# Patient Record
Sex: MALE | Race: WHITE | NOT HISPANIC OR LATINO | URBAN - METROPOLITAN AREA
[De-identification: names, ages, dates, MRNs, and addresses within clinical notes are randomized per-mention and may not be internally consistent; named-entity substitution may affect disease eponyms.]

---

## 2017-05-18 ENCOUNTER — APPOINTMENT (OUTPATIENT)
Dept: URBAN - METROPOLITAN AREA CLINIC 200 | Age: 47
Setting detail: DERMATOLOGY
End: 2017-05-31

## 2017-05-18 DIAGNOSIS — L57.8 OTHER SKIN CHANGES DUE TO CHRONIC EXPOSURE TO NONIONIZING RADIATION: ICD-10-CM

## 2017-05-18 DIAGNOSIS — L70.0 ACNE VULGARIS: ICD-10-CM

## 2017-05-18 PROBLEM — D48.5 NEOPLASM OF UNCERTAIN BEHAVIOR OF SKIN: Status: ACTIVE | Noted: 2017-05-18

## 2017-05-18 PROCEDURE — OTHER COUNSELING: OTHER

## 2017-05-18 PROCEDURE — 99213 OFFICE O/P EST LOW 20 MIN: CPT

## 2017-05-18 PROCEDURE — OTHER PRESCRIPTION: OTHER

## 2017-05-18 RX ORDER — DOXYCYCLINE HYCLATE 20 MG/1
TABLET, FILM COATED ORAL
Qty: 60 | Refills: 4 | Status: ERX | COMMUNITY
Start: 2017-05-18

## 2017-05-18 RX ORDER — DAPSONE 75 MG/G
GEL TOPICAL
Qty: 1 | Refills: 3 | Status: ERX | COMMUNITY
Start: 2017-05-18

## 2017-05-18 ASSESSMENT — LOCATION ZONE DERM
LOCATION ZONE: FACE
LOCATION ZONE: TRUNK

## 2017-05-18 ASSESSMENT — LOCATION DETAILED DESCRIPTION DERM
LOCATION DETAILED: RIGHT INFERIOR CENTRAL MALAR CHEEK
LOCATION DETAILED: RIGHT SUPERIOR UPPER BACK
LOCATION DETAILED: LEFT CENTRAL MALAR CHEEK

## 2017-05-18 ASSESSMENT — LOCATION SIMPLE DESCRIPTION DERM
LOCATION SIMPLE: RIGHT CHEEK
LOCATION SIMPLE: LEFT CHEEK
LOCATION SIMPLE: RIGHT UPPER BACK

## 2018-07-28 ENCOUNTER — INPATIENT (INPATIENT)
Facility: HOSPITAL | Age: 48
LOS: 5 days | Discharge: HOME | End: 2018-08-03
Attending: INTERNAL MEDICINE | Admitting: INTERNAL MEDICINE
Payer: COMMERCIAL

## 2018-07-28 VITALS
HEART RATE: 130 BPM | RESPIRATION RATE: 20 BRPM | OXYGEN SATURATION: 95 % | SYSTOLIC BLOOD PRESSURE: 136 MMHG | TEMPERATURE: 104 F | DIASTOLIC BLOOD PRESSURE: 94 MMHG | WEIGHT: 173.06 LBS

## 2018-07-28 DIAGNOSIS — L03.221 CELLULITIS OF NECK: ICD-10-CM

## 2018-07-28 DIAGNOSIS — A41.9 SEPSIS, UNSPECIFIED ORGANISM: ICD-10-CM

## 2018-07-28 DIAGNOSIS — F41.9 ANXIETY DISORDER, UNSPECIFIED: ICD-10-CM

## 2018-07-28 DIAGNOSIS — K58.9 IRRITABLE BOWEL SYNDROME WITHOUT DIARRHEA: ICD-10-CM

## 2018-07-28 DIAGNOSIS — Z98.890 OTHER SPECIFIED POSTPROCEDURAL STATES: Chronic | ICD-10-CM

## 2018-07-28 LAB
ALBUMIN SERPL ELPH-MCNC: 3.8 G/DL — SIGNIFICANT CHANGE UP (ref 3.3–5)
ALP SERPL-CCNC: 112 U/L — SIGNIFICANT CHANGE UP (ref 40–120)
ALT FLD-CCNC: 28 U/L — SIGNIFICANT CHANGE UP (ref 12–78)
ANION GAP SERPL CALC-SCNC: 8 MMOL/L — SIGNIFICANT CHANGE UP (ref 5–17)
APPEARANCE UR: CLEAR — SIGNIFICANT CHANGE UP
APTT BLD: 30.8 SEC — SIGNIFICANT CHANGE UP (ref 27.5–37.4)
AST SERPL-CCNC: 33 U/L — SIGNIFICANT CHANGE UP (ref 15–37)
BACTERIA # UR AUTO: ABNORMAL
BASOPHILS # BLD AUTO: 0.02 K/UL — SIGNIFICANT CHANGE UP (ref 0–0.2)
BASOPHILS NFR BLD AUTO: 0.4 % — SIGNIFICANT CHANGE UP (ref 0–2)
BILIRUB SERPL-MCNC: 1 MG/DL — SIGNIFICANT CHANGE UP (ref 0.2–1.2)
BILIRUB UR-MCNC: NEGATIVE — SIGNIFICANT CHANGE UP
BLD GP AB SCN SERPL QL: SIGNIFICANT CHANGE UP
BUN SERPL-MCNC: 15 MG/DL — SIGNIFICANT CHANGE UP (ref 7–23)
CALCIUM SERPL-MCNC: 8.6 MG/DL — SIGNIFICANT CHANGE UP (ref 8.5–10.1)
CHLORIDE SERPL-SCNC: 104 MMOL/L — SIGNIFICANT CHANGE UP (ref 96–108)
CO2 SERPL-SCNC: 28 MMOL/L — SIGNIFICANT CHANGE UP (ref 22–31)
COLOR SPEC: YELLOW — SIGNIFICANT CHANGE UP
CREAT SERPL-MCNC: 1.15 MG/DL — SIGNIFICANT CHANGE UP (ref 0.5–1.3)
CRP SERPL-MCNC: 5.53 MG/DL — HIGH (ref 0–0.4)
DIFF PNL FLD: ABNORMAL
EOSINOPHIL # BLD AUTO: 0.01 K/UL — SIGNIFICANT CHANGE UP (ref 0–0.5)
EOSINOPHIL NFR BLD AUTO: 0.2 % — SIGNIFICANT CHANGE UP (ref 0–6)
EPI CELLS # UR: SIGNIFICANT CHANGE UP
ERYTHROCYTE [SEDIMENTATION RATE] IN BLOOD: 10 MM/HR — SIGNIFICANT CHANGE UP (ref 0–15)
ERYTHROCYTE [SEDIMENTATION RATE] IN BLOOD: 7 MM/HR — SIGNIFICANT CHANGE UP (ref 0–15)
GLUCOSE SERPL-MCNC: 137 MG/DL — HIGH (ref 70–99)
GLUCOSE UR QL: NEGATIVE MG/DL — SIGNIFICANT CHANGE UP
HCT VFR BLD CALC: 43.6 % — SIGNIFICANT CHANGE UP (ref 39–50)
HGB BLD-MCNC: 15.8 G/DL — SIGNIFICANT CHANGE UP (ref 13–17)
IMM GRANULOCYTES NFR BLD AUTO: 0.2 % — SIGNIFICANT CHANGE UP (ref 0–1.5)
INR BLD: 1.09 RATIO — SIGNIFICANT CHANGE UP (ref 0.88–1.16)
KETONES UR-MCNC: ABNORMAL
LACTATE SERPL-SCNC: 0.6 MMOL/L — LOW (ref 0.7–2)
LACTATE SERPL-SCNC: 1.1 MMOL/L — SIGNIFICANT CHANGE UP (ref 0.7–2)
LEUKOCYTE ESTERASE UR-ACNC: NEGATIVE — SIGNIFICANT CHANGE UP
LYMPHOCYTES # BLD AUTO: 0.65 K/UL — LOW (ref 1–3.3)
LYMPHOCYTES # BLD AUTO: 12.3 % — LOW (ref 13–44)
MAGNESIUM SERPL-MCNC: 1.9 MG/DL — SIGNIFICANT CHANGE UP (ref 1.6–2.6)
MCHC RBC-ENTMCNC: 31.3 PG — SIGNIFICANT CHANGE UP (ref 27–34)
MCHC RBC-ENTMCNC: 36.2 GM/DL — HIGH (ref 32–36)
MCV RBC AUTO: 86.3 FL — SIGNIFICANT CHANGE UP (ref 80–100)
MONOCYTES # BLD AUTO: 0.58 K/UL — SIGNIFICANT CHANGE UP (ref 0–0.9)
MONOCYTES NFR BLD AUTO: 11 % — SIGNIFICANT CHANGE UP (ref 2–14)
NEUTROPHILS # BLD AUTO: 4.01 K/UL — SIGNIFICANT CHANGE UP (ref 1.8–7.4)
NEUTROPHILS NFR BLD AUTO: 75.9 % — SIGNIFICANT CHANGE UP (ref 43–77)
NITRITE UR-MCNC: NEGATIVE — SIGNIFICANT CHANGE UP
NRBC # BLD: 0 /100 WBCS — SIGNIFICANT CHANGE UP (ref 0–0)
PH UR: 5 — SIGNIFICANT CHANGE UP (ref 5–8)
PLATELET # BLD AUTO: 184 K/UL — SIGNIFICANT CHANGE UP (ref 150–400)
POTASSIUM SERPL-MCNC: 3.7 MMOL/L — SIGNIFICANT CHANGE UP (ref 3.5–5.3)
POTASSIUM SERPL-SCNC: 3.7 MMOL/L — SIGNIFICANT CHANGE UP (ref 3.5–5.3)
PROT SERPL-MCNC: 7.2 GM/DL — SIGNIFICANT CHANGE UP (ref 6–8.3)
PROT UR-MCNC: 15 MG/DL
PROTHROM AB SERPL-ACNC: 11.9 SEC — SIGNIFICANT CHANGE UP (ref 9.8–12.7)
RBC # BLD: 5.05 M/UL — SIGNIFICANT CHANGE UP (ref 4.2–5.8)
RBC # FLD: 11.8 % — SIGNIFICANT CHANGE UP (ref 10.3–14.5)
RBC CASTS # UR COMP ASSIST: SIGNIFICANT CHANGE UP /HPF (ref 0–4)
SODIUM SERPL-SCNC: 140 MMOL/L — SIGNIFICANT CHANGE UP (ref 135–145)
SP GR SPEC: 1.02 — SIGNIFICANT CHANGE UP (ref 1.01–1.02)
UROBILINOGEN FLD QL: NEGATIVE MG/DL — SIGNIFICANT CHANGE UP
WBC # BLD: 5.28 K/UL — SIGNIFICANT CHANGE UP (ref 3.8–10.5)
WBC # FLD AUTO: 5.28 K/UL — SIGNIFICANT CHANGE UP (ref 3.8–10.5)
WBC UR QL: SIGNIFICANT CHANGE UP

## 2018-07-28 PROCEDURE — 71045 X-RAY EXAM CHEST 1 VIEW: CPT | Mod: 26

## 2018-07-28 PROCEDURE — 99291 CRITICAL CARE FIRST HOUR: CPT

## 2018-07-28 PROCEDURE — 72141 MRI NECK SPINE W/O DYE: CPT | Mod: 26

## 2018-07-28 RX ORDER — HYDROMORPHONE HYDROCHLORIDE 2 MG/ML
1 INJECTION INTRAMUSCULAR; INTRAVENOUS; SUBCUTANEOUS ONCE
Qty: 0 | Refills: 0 | Status: DISCONTINUED | OUTPATIENT
Start: 2018-07-28 | End: 2018-07-28

## 2018-07-28 RX ORDER — DIAZEPAM 5 MG
5 TABLET ORAL
Qty: 0 | Refills: 0 | Status: DISCONTINUED | OUTPATIENT
Start: 2018-07-28 | End: 2018-08-03

## 2018-07-28 RX ORDER — SODIUM CHLORIDE 9 MG/ML
1000 INJECTION INTRAMUSCULAR; INTRAVENOUS; SUBCUTANEOUS
Qty: 0 | Refills: 0 | Status: DISCONTINUED | OUTPATIENT
Start: 2018-07-28 | End: 2018-08-03

## 2018-07-28 RX ORDER — PIPERACILLIN AND TAZOBACTAM 4; .5 G/20ML; G/20ML
3.38 INJECTION, POWDER, LYOPHILIZED, FOR SOLUTION INTRAVENOUS ONCE
Qty: 0 | Refills: 0 | Status: COMPLETED | OUTPATIENT
Start: 2018-07-28 | End: 2018-07-28

## 2018-07-28 RX ORDER — ENOXAPARIN SODIUM 100 MG/ML
40 INJECTION SUBCUTANEOUS DAILY
Qty: 0 | Refills: 0 | Status: DISCONTINUED | OUTPATIENT
Start: 2018-07-28 | End: 2018-08-03

## 2018-07-28 RX ORDER — SODIUM CHLORIDE 9 MG/ML
3000 INJECTION INTRAMUSCULAR; INTRAVENOUS; SUBCUTANEOUS ONCE
Qty: 0 | Refills: 0 | Status: COMPLETED | OUTPATIENT
Start: 2018-07-28 | End: 2018-07-28

## 2018-07-28 RX ORDER — ACETAMINOPHEN 500 MG
650 TABLET ORAL EVERY 6 HOURS
Qty: 0 | Refills: 0 | Status: DISCONTINUED | OUTPATIENT
Start: 2018-07-28 | End: 2018-08-02

## 2018-07-28 RX ORDER — DIAZEPAM 5 MG
0 TABLET ORAL
Qty: 0 | Refills: 0 | COMMUNITY

## 2018-07-28 RX ORDER — ACETAMINOPHEN 500 MG
975 TABLET ORAL ONCE
Qty: 0 | Refills: 0 | Status: COMPLETED | OUTPATIENT
Start: 2018-07-28 | End: 2018-07-28

## 2018-07-28 RX ORDER — VANCOMYCIN HCL 1 G
1000 VIAL (EA) INTRAVENOUS ONCE
Qty: 0 | Refills: 0 | Status: COMPLETED | OUTPATIENT
Start: 2018-07-28 | End: 2018-07-28

## 2018-07-28 RX ORDER — ACETAMINOPHEN 500 MG
650 TABLET ORAL EVERY 6 HOURS
Qty: 0 | Refills: 0 | Status: DISCONTINUED | OUTPATIENT
Start: 2018-07-28 | End: 2018-07-30

## 2018-07-28 RX ORDER — DIPHENOXYLATE HCL/ATROPINE 2.5-.025MG
1 TABLET ORAL
Qty: 0 | Refills: 0 | Status: DISCONTINUED | OUTPATIENT
Start: 2018-07-28 | End: 2018-07-30

## 2018-07-28 RX ORDER — IBUPROFEN 200 MG
400 TABLET ORAL ONCE
Qty: 0 | Refills: 0 | Status: COMPLETED | OUTPATIENT
Start: 2018-07-28 | End: 2018-07-28

## 2018-07-28 RX ORDER — HYDROMORPHONE HYDROCHLORIDE 2 MG/ML
2 INJECTION INTRAMUSCULAR; INTRAVENOUS; SUBCUTANEOUS EVERY 6 HOURS
Qty: 0 | Refills: 0 | Status: DISCONTINUED | OUTPATIENT
Start: 2018-07-28 | End: 2018-07-31

## 2018-07-28 RX ORDER — DIPHENHYDRAMINE HCL 50 MG
25 CAPSULE ORAL ONCE
Qty: 0 | Refills: 0 | Status: COMPLETED | OUTPATIENT
Start: 2018-07-28 | End: 2018-07-28

## 2018-07-28 RX ORDER — OXYCODONE AND ACETAMINOPHEN 5; 325 MG/1; MG/1
1 TABLET ORAL EVERY 6 HOURS
Qty: 0 | Refills: 0 | Status: DISCONTINUED | OUTPATIENT
Start: 2018-07-28 | End: 2018-07-30

## 2018-07-28 RX ORDER — DIPHENOXYLATE HCL/ATROPINE 2.5-.025MG
0 TABLET ORAL
Qty: 0 | Refills: 0 | COMMUNITY

## 2018-07-28 RX ORDER — PIPERACILLIN AND TAZOBACTAM 4; .5 G/20ML; G/20ML
3.38 INJECTION, POWDER, LYOPHILIZED, FOR SOLUTION INTRAVENOUS EVERY 8 HOURS
Qty: 0 | Refills: 0 | Status: DISCONTINUED | OUTPATIENT
Start: 2018-07-28 | End: 2018-07-29

## 2018-07-28 RX ORDER — VANCOMYCIN HCL 1 G
1000 VIAL (EA) INTRAVENOUS EVERY 12 HOURS
Qty: 0 | Refills: 0 | Status: DISCONTINUED | OUTPATIENT
Start: 2018-07-28 | End: 2018-08-01

## 2018-07-28 RX ADMIN — SODIUM CHLORIDE 3000 MILLILITER(S): 9 INJECTION INTRAMUSCULAR; INTRAVENOUS; SUBCUTANEOUS at 15:46

## 2018-07-28 RX ADMIN — HYDROMORPHONE HYDROCHLORIDE 2 MILLIGRAM(S): 2 INJECTION INTRAMUSCULAR; INTRAVENOUS; SUBCUTANEOUS at 23:37

## 2018-07-28 RX ADMIN — Medication 400 MILLIGRAM(S): at 19:16

## 2018-07-28 RX ADMIN — PIPERACILLIN AND TAZOBACTAM 200 GRAM(S): 4; .5 INJECTION, POWDER, LYOPHILIZED, FOR SOLUTION INTRAVENOUS at 15:46

## 2018-07-28 RX ADMIN — Medication 1000 MILLIGRAM(S): at 17:41

## 2018-07-28 RX ADMIN — PIPERACILLIN AND TAZOBACTAM 3.38 GRAM(S): 4; .5 INJECTION, POWDER, LYOPHILIZED, FOR SOLUTION INTRAVENOUS at 16:42

## 2018-07-28 RX ADMIN — Medication 250 MILLIGRAM(S): at 16:42

## 2018-07-28 RX ADMIN — SODIUM CHLORIDE 3000 MILLILITER(S): 9 INJECTION INTRAMUSCULAR; INTRAVENOUS; SUBCUTANEOUS at 17:15

## 2018-07-28 RX ADMIN — HYDROMORPHONE HYDROCHLORIDE 1 MILLIGRAM(S): 2 INJECTION INTRAMUSCULAR; INTRAVENOUS; SUBCUTANEOUS at 18:42

## 2018-07-28 RX ADMIN — HYDROMORPHONE HYDROCHLORIDE 1 MILLIGRAM(S): 2 INJECTION INTRAMUSCULAR; INTRAVENOUS; SUBCUTANEOUS at 15:46

## 2018-07-28 RX ADMIN — HYDROMORPHONE HYDROCHLORIDE 1 MILLIGRAM(S): 2 INJECTION INTRAMUSCULAR; INTRAVENOUS; SUBCUTANEOUS at 17:53

## 2018-07-28 RX ADMIN — HYDROMORPHONE HYDROCHLORIDE 2 MILLIGRAM(S): 2 INJECTION INTRAMUSCULAR; INTRAVENOUS; SUBCUTANEOUS at 23:21

## 2018-07-28 RX ADMIN — Medication 25 MILLIGRAM(S): at 17:53

## 2018-07-28 RX ADMIN — SODIUM CHLORIDE 75 MILLILITER(S): 9 INJECTION INTRAMUSCULAR; INTRAVENOUS; SUBCUTANEOUS at 20:33

## 2018-07-28 RX ADMIN — PIPERACILLIN AND TAZOBACTAM 25 GRAM(S): 4; .5 INJECTION, POWDER, LYOPHILIZED, FOR SOLUTION INTRAVENOUS at 22:32

## 2018-07-28 RX ADMIN — Medication 975 MILLIGRAM(S): at 16:01

## 2018-07-28 RX ADMIN — Medication 400 MILLIGRAM(S): at 18:40

## 2018-07-28 RX ADMIN — HYDROMORPHONE HYDROCHLORIDE 1 MILLIGRAM(S): 2 INJECTION INTRAMUSCULAR; INTRAVENOUS; SUBCUTANEOUS at 16:05

## 2018-07-28 NOTE — ED ADULT NURSE NOTE - CHIEF COMPLAINT QUOTE
pt sent in from [facility] s/p cercival spine surgery july 2nd 2018, now febrile and tachycardic told to report to the ED code sepsis called

## 2018-07-28 NOTE — ED ADULT NURSE NOTE - OBJECTIVE STATEMENT
49 yo male c/o fever and pain x 2-3 days, first low grade now higher, went to urgent care today, told to go to ER right away, pt ambulated into fast track c a steady gait and no assistance needed, s/p cervical spine surgery 07/02/18 [place]. 47 yo male c/o fever and pain x 2-3 days, first low grade now higher, went to urgent care today, told to go to ER right away to r/o infection. , pt ambulated into fast track c a steady gait and no assistance needed, s/p cervical spine surgery 07/02/18 in [place]. pt seen at [facility] urgent care for fever post-surgical c concern for spinal abscess at surgical site. neuro Surgeon Cornell Yan.

## 2018-07-28 NOTE — ED ADULT NURSE NOTE - CHPI ED SYMPTOMS NEG
no cough/no shortness of breath/no vomiting/no chills/no diarrhea/no decreased eating/drinking/no rash/no headache/no abdominal pain

## 2018-07-28 NOTE — ED ADULT NURSE NOTE - CAS EDN DISCHARGE ASSESSMENT
Alert and oriented to person, place and time Alert and oriented to person, place and time/Patient in no distress.

## 2018-07-28 NOTE — H&P ADULT - HISTORY OF PRESENT ILLNESS
presents  with  fever  for  3 days  had  cervical  spine laminectomy  3  weeks  ago evaluated  in  urgent care center  advised to  go  to  Er  evaluated  in  Er  found  to  have  sepsis  admitted  for  further  w/u  and  treatment

## 2018-07-28 NOTE — ED PROVIDER NOTE - PHYSICAL EXAMINATION
Gen: Alert, NAD  Head: NC, AT   Eyes: PERRL, EOMI, normal lids/conjunctiva  ENT: normal hearing, patent oropharynx without erythema/exudate, uvula midline  Neck: supple, no tenderness, Trachea midline  Pulm: Bilateral BS, normal resp effort, no wheeze/stridor/retractions  CV: tachycardia, no M/R/G, 2+ radial and dp pulses bl, no edema  Abd: soft, NT/ND, +BS, no hepatosplenomegaly  Mskel: extremities x4 with normal ROM and no joint effusions. warmth and soft tissue swelling along the cervical spine.   Skin: no rash, no bruising   Neuro: AAOx3, no sensory/motor deficits, CN 2-12 intact

## 2018-07-28 NOTE — ED PROVIDER NOTE - OBJECTIVE STATEMENT
Pertinent PMH/PSH/FHx/SHx and Review of Systems contained within:  48M hx of cervical spine surgery jul 2 in [place] by dr aviles pw 2-3 days of fever and increasing pain in the cervical spine region where the surgery took place. patient has no nausea, vomiting, cough, abd pain, dysuria, rhinorrhea, rash or bleeding. Patient denies numbness, tingling or weakness of the upper or lower extremity and denies retention or incontinence of the bowel or bladder. patient has been taking tylenol for the fever intermittently and hydromorphone for pain.   Fh and Sh not otherwise contributory  ROS otherwise negative

## 2018-07-28 NOTE — H&P ADULT - NSHPPHYSICALEXAM_GEN_ALL_CORE
PHYSICAL EXAM:    GENERAL: NAD, well-groomed, well-developed  HEAD:  Atraumatic, Normocephalic  EYES: EOMI, PERRLA, conjunctiva and sclera clear  ENMT: No tonsillar erythema, exudates, or enlargement; Moist mucous membranes, , No lesions  NECK: Supple, No JVD, Normal thyroid  NERVOUS SYSTEM:  Alert & Oriented X4, ; Motor Strength 5/5 B/L upper and lower extremities; DTRs 2+ intact and symmetric  CHEST/LUNG: Clear  bilaterally; No rales, rhonchi, wheezing, or rubs  HEART: Regular rate and rhythm; No murmurs, rubs, or gallops  ABDOMEN: Soft, Nontender, Nondistended; no  masses Bowel sounds present  EXTREMITIES:  + Peripheral Pulses, No clubbing, cyanosis, or edema  LYMPH: No lymphadenopathy noted   RECTAL: deferred  SKIN: No rashes or lesions  NECK  surgical  wound  clean  no bleeding  no  discharge  mild  surrounding  erythema no t enderness

## 2018-07-28 NOTE — ED PROVIDER NOTE - MEDICAL DECISION MAKING DETAILS
patient pw sepsis in the context of recent cervical spine operation. will MRI stat to rule out cervical spine infection. will xray to rule out pna and get urine to rule out uti patient pw sepsis in the context of recent cervical spine operation. will MRI stat to rule out cervical spine infection. mri reassuring. will xray to rule out pna and get urine to rule out uti. suspect the etiology of the fever is cellulitis. will admit for cultures to result. I read ekg as sinus tach rate 114 with low voltages, no st elevation or depression, no qtc or pr prolongation.

## 2018-07-28 NOTE — H&P ADULT - NSHPLABSRESULTS_GEN_ALL_CORE
LABS:                        15.8   .  )-----------( 184      ( 2018 15:53 )             43.6         140  |  104  |  15  ----------------------------<  137<H>  3.7   |  28  |  1.15    Ca    8.6      2018 15:53  Mg     1.9         TPro  7.2  /  Alb  3.8  /  TBili  1.0  /  DBili  x   /  AST  33  /  ALT  28  /  AlkPhos  112      PT/INR - ( 2018 15:53 )   PT: 11.9 sec;   INR: 1.09 ratio         PTT - ( 2018 15:53 )  PTT:30.8 sec  Urinalysis Basic - ( 2018 17:18 )    Color: Yellow / Appearance: Clear / S.020 / pH: x  Gluc: x / Ketone: Trace  / Bili: Negative / Urobili: Negative mg/dL   Blood: x / Protein: 15 mg/dL / Nitrite: Negative   Leuk Esterase: Negative / RBC: 0-2 /HPF / WBC 0-2   Sq Epi: x / Non Sq Epi: Occasional / Bacteria: Moderate

## 2018-07-28 NOTE — ED ADULT NURSE NOTE - LINE DESCRIPTION (INCLUDE FLUIDS IF APPLICABLE)
BUE #18 g, flush without difficulty, no s/s of infiltraiton BUE #18 g, flush without difficulty, no s/s of infiltration, new bag of zosyn infusing.

## 2018-07-28 NOTE — ED ADULT NURSE REASSESSMENT NOTE - NS ED NURSE REASSESS COMMENT FT1
pt reports feeling better, itching resolved, pain decreased, pt comfortable. pt aware he is admitted. VS STABLE AFEBRILE CALL UMANZOR IN REACH, SAFETY MAINTAINED PT PLACED ON TELE will continue to monitor.

## 2018-07-28 NOTE — H&P ADULT - NSHPREVIEWOFSYSTEMS_GEN_ALL_CORE
+ body  aches  fever  mild  neck  pain .    no  cough  no  sore  throat  no  dysuria  no  abd  pain  no  ha

## 2018-07-28 NOTE — ED ADULT TRIAGE NOTE - CHIEF COMPLAINT QUOTE
pt sent in from [facility] s/p cercival spine surgery july 2nd 2018, now febrile and tachycardic told to report to the ED code sepsis called

## 2018-07-28 NOTE — ED PROVIDER NOTE - CARE PLAN
Principal Discharge DX:	Sepsis, due to unspecified organism Principal Discharge DX:	Sepsis, due to unspecified organism  Secondary Diagnosis:	Cellulitis of neck

## 2018-07-29 LAB
ALBUMIN SERPL ELPH-MCNC: 2.8 G/DL — LOW (ref 3.3–5)
ALP SERPL-CCNC: 86 U/L — SIGNIFICANT CHANGE UP (ref 40–120)
ALT FLD-CCNC: 31 U/L — SIGNIFICANT CHANGE UP (ref 12–78)
ANION GAP SERPL CALC-SCNC: 9 MMOL/L — SIGNIFICANT CHANGE UP (ref 5–17)
AST SERPL-CCNC: 41 U/L — HIGH (ref 15–37)
BILIRUB SERPL-MCNC: 0.7 MG/DL — SIGNIFICANT CHANGE UP (ref 0.2–1.2)
BUN SERPL-MCNC: 10 MG/DL — SIGNIFICANT CHANGE UP (ref 7–23)
CALCIUM SERPL-MCNC: 7.6 MG/DL — LOW (ref 8.5–10.1)
CHLORIDE SERPL-SCNC: 108 MMOL/L — SIGNIFICANT CHANGE UP (ref 96–108)
CO2 SERPL-SCNC: 24 MMOL/L — SIGNIFICANT CHANGE UP (ref 22–31)
CREAT SERPL-MCNC: 0.94 MG/DL — SIGNIFICANT CHANGE UP (ref 0.5–1.3)
CRP SERPL-MCNC: 4.53 MG/DL — HIGH (ref 0–0.4)
CULTURE RESULTS: NO GROWTH — SIGNIFICANT CHANGE UP
GLUCOSE SERPL-MCNC: 137 MG/DL — HIGH (ref 70–99)
HCT VFR BLD CALC: 35.4 % — LOW (ref 39–50)
HGB BLD-MCNC: 13 G/DL — SIGNIFICANT CHANGE UP (ref 13–17)
HIV 1+2 AB+HIV1 P24 AG SERPL QL IA: SIGNIFICANT CHANGE UP
LACTATE SERPL-SCNC: 0.6 MMOL/L — LOW (ref 0.7–2)
MCHC RBC-ENTMCNC: 32 PG — SIGNIFICANT CHANGE UP (ref 27–34)
MCHC RBC-ENTMCNC: 36.7 GM/DL — HIGH (ref 32–36)
MCV RBC AUTO: 87.2 FL — SIGNIFICANT CHANGE UP (ref 80–100)
NRBC # BLD: 0 /100 WBCS — SIGNIFICANT CHANGE UP (ref 0–0)
PLATELET # BLD AUTO: 156 K/UL — SIGNIFICANT CHANGE UP (ref 150–400)
POTASSIUM SERPL-MCNC: 3.5 MMOL/L — SIGNIFICANT CHANGE UP (ref 3.5–5.3)
POTASSIUM SERPL-SCNC: 3.5 MMOL/L — SIGNIFICANT CHANGE UP (ref 3.5–5.3)
PROT SERPL-MCNC: 5.4 GM/DL — LOW (ref 6–8.3)
RBC # BLD: 4.06 M/UL — LOW (ref 4.2–5.8)
RBC # FLD: 11.7 % — SIGNIFICANT CHANGE UP (ref 10.3–14.5)
SODIUM SERPL-SCNC: 141 MMOL/L — SIGNIFICANT CHANGE UP (ref 135–145)
SPECIMEN SOURCE: SIGNIFICANT CHANGE UP
WBC # BLD: 4.06 K/UL — SIGNIFICANT CHANGE UP (ref 3.8–10.5)
WBC # FLD AUTO: 4.06 K/UL — SIGNIFICANT CHANGE UP (ref 3.8–10.5)

## 2018-07-29 PROCEDURE — 93010 ELECTROCARDIOGRAM REPORT: CPT

## 2018-07-29 RX ORDER — CEFEPIME 1 G/1
2000 INJECTION, POWDER, FOR SOLUTION INTRAMUSCULAR; INTRAVENOUS EVERY 8 HOURS
Qty: 0 | Refills: 0 | Status: DISCONTINUED | OUTPATIENT
Start: 2018-07-30 | End: 2018-08-01

## 2018-07-29 RX ORDER — ALPRAZOLAM 0.25 MG
1 TABLET ORAL AT BEDTIME
Qty: 0 | Refills: 0 | Status: DISCONTINUED | OUTPATIENT
Start: 2018-07-29 | End: 2018-07-29

## 2018-07-29 RX ORDER — IBUPROFEN 200 MG
400 TABLET ORAL ONCE
Qty: 0 | Refills: 0 | Status: COMPLETED | OUTPATIENT
Start: 2018-07-29 | End: 2018-07-29

## 2018-07-29 RX ORDER — CEFEPIME 1 G/1
INJECTION, POWDER, FOR SOLUTION INTRAMUSCULAR; INTRAVENOUS
Qty: 0 | Refills: 0 | Status: DISCONTINUED | OUTPATIENT
Start: 2018-07-30 | End: 2018-08-01

## 2018-07-29 RX ORDER — CEFEPIME 1 G/1
2000 INJECTION, POWDER, FOR SOLUTION INTRAMUSCULAR; INTRAVENOUS ONCE
Qty: 0 | Refills: 0 | Status: COMPLETED | OUTPATIENT
Start: 2018-07-29 | End: 2018-07-30

## 2018-07-29 RX ORDER — DIPHENHYDRAMINE HCL 50 MG
25 CAPSULE ORAL ONCE
Qty: 0 | Refills: 0 | Status: COMPLETED | OUTPATIENT
Start: 2018-07-29 | End: 2018-07-29

## 2018-07-29 RX ADMIN — Medication 5 MILLIGRAM(S): at 14:19

## 2018-07-29 RX ADMIN — Medication 25 MILLIGRAM(S): at 19:44

## 2018-07-29 RX ADMIN — Medication 1 MILLIGRAM(S): at 22:10

## 2018-07-29 RX ADMIN — Medication 400 MILLIGRAM(S): at 17:45

## 2018-07-29 RX ADMIN — HYDROMORPHONE HYDROCHLORIDE 2 MILLIGRAM(S): 2 INJECTION INTRAMUSCULAR; INTRAVENOUS; SUBCUTANEOUS at 18:26

## 2018-07-29 RX ADMIN — HYDROMORPHONE HYDROCHLORIDE 2 MILLIGRAM(S): 2 INJECTION INTRAMUSCULAR; INTRAVENOUS; SUBCUTANEOUS at 06:20

## 2018-07-29 RX ADMIN — HYDROMORPHONE HYDROCHLORIDE 2 MILLIGRAM(S): 2 INJECTION INTRAMUSCULAR; INTRAVENOUS; SUBCUTANEOUS at 18:37

## 2018-07-29 RX ADMIN — Medication 400 MILLIGRAM(S): at 18:09

## 2018-07-29 RX ADMIN — OXYCODONE AND ACETAMINOPHEN 1 TABLET(S): 5; 325 TABLET ORAL at 05:20

## 2018-07-29 RX ADMIN — HYDROMORPHONE HYDROCHLORIDE 2 MILLIGRAM(S): 2 INJECTION INTRAMUSCULAR; INTRAVENOUS; SUBCUTANEOUS at 12:35

## 2018-07-29 RX ADMIN — Medication 5 MILLIGRAM(S): at 01:51

## 2018-07-29 RX ADMIN — OXYCODONE AND ACETAMINOPHEN 1 TABLET(S): 5; 325 TABLET ORAL at 09:54

## 2018-07-29 RX ADMIN — OXYCODONE AND ACETAMINOPHEN 1 TABLET(S): 5; 325 TABLET ORAL at 22:09

## 2018-07-29 RX ADMIN — HYDROMORPHONE HYDROCHLORIDE 2 MILLIGRAM(S): 2 INJECTION INTRAMUSCULAR; INTRAVENOUS; SUBCUTANEOUS at 06:35

## 2018-07-29 RX ADMIN — OXYCODONE AND ACETAMINOPHEN 1 TABLET(S): 5; 325 TABLET ORAL at 23:10

## 2018-07-29 RX ADMIN — ENOXAPARIN SODIUM 40 MILLIGRAM(S): 100 INJECTION SUBCUTANEOUS at 12:25

## 2018-07-29 RX ADMIN — Medication 650 MILLIGRAM(S): at 17:28

## 2018-07-29 RX ADMIN — OXYCODONE AND ACETAMINOPHEN 1 TABLET(S): 5; 325 TABLET ORAL at 04:19

## 2018-07-29 RX ADMIN — Medication 650 MILLIGRAM(S): at 09:59

## 2018-07-29 RX ADMIN — Medication 650 MILLIGRAM(S): at 01:59

## 2018-07-29 RX ADMIN — Medication 250 MILLIGRAM(S): at 06:19

## 2018-07-29 RX ADMIN — Medication 250 MILLIGRAM(S): at 17:35

## 2018-07-29 RX ADMIN — OXYCODONE AND ACETAMINOPHEN 1 TABLET(S): 5; 325 TABLET ORAL at 10:50

## 2018-07-29 RX ADMIN — PIPERACILLIN AND TAZOBACTAM 25 GRAM(S): 4; .5 INJECTION, POWDER, LYOPHILIZED, FOR SOLUTION INTRAVENOUS at 14:11

## 2018-07-29 RX ADMIN — PIPERACILLIN AND TAZOBACTAM 25 GRAM(S): 4; .5 INJECTION, POWDER, LYOPHILIZED, FOR SOLUTION INTRAVENOUS at 22:06

## 2018-07-29 RX ADMIN — HYDROMORPHONE HYDROCHLORIDE 2 MILLIGRAM(S): 2 INJECTION INTRAMUSCULAR; INTRAVENOUS; SUBCUTANEOUS at 12:23

## 2018-07-29 RX ADMIN — PIPERACILLIN AND TAZOBACTAM 25 GRAM(S): 4; .5 INJECTION, POWDER, LYOPHILIZED, FOR SOLUTION INTRAVENOUS at 06:19

## 2018-07-29 NOTE — CHART NOTE - NSCHARTNOTEFT_GEN_A_CORE
infectiuos disease prelim   chart reviewed   only pain in cervical area  sp laminectomy   on antibiotics already   discussed with nurse   minimal swelling of neck   minimal erythema   no nausea vomiting diarrhea as per nurse; has  ibs; on lomotil  very low esr crp for infection/osteo   mri NEGATIVE   will check vanco levels   change to fortaz vanco   will follow with you thanks   full consult am

## 2018-07-30 LAB
ANION GAP SERPL CALC-SCNC: 10 MMOL/L — SIGNIFICANT CHANGE UP (ref 5–17)
BUN SERPL-MCNC: 10 MG/DL — SIGNIFICANT CHANGE UP (ref 7–23)
CALCIUM SERPL-MCNC: 7.7 MG/DL — LOW (ref 8.5–10.1)
CHLORIDE SERPL-SCNC: 106 MMOL/L — SIGNIFICANT CHANGE UP (ref 96–108)
CO2 SERPL-SCNC: 24 MMOL/L — SIGNIFICANT CHANGE UP (ref 22–31)
CREAT SERPL-MCNC: 1.18 MG/DL — SIGNIFICANT CHANGE UP (ref 0.5–1.3)
CRP SERPL-MCNC: 8.25 MG/DL — HIGH (ref 0–0.4)
FLUAV SPEC QL CULT: NEGATIVE — SIGNIFICANT CHANGE UP
FLUBV AG SPEC QL IA: NEGATIVE — SIGNIFICANT CHANGE UP
GLUCOSE SERPL-MCNC: 151 MG/DL — HIGH (ref 70–99)
HBA1C BLD-MCNC: 5.1 % — SIGNIFICANT CHANGE UP (ref 4–5.6)
HCT VFR BLD CALC: 33.9 % — LOW (ref 39–50)
HGB BLD-MCNC: 12.3 G/DL — LOW (ref 13–17)
LACTATE SERPL-SCNC: 0.9 MMOL/L — SIGNIFICANT CHANGE UP (ref 0.7–2)
MCHC RBC-ENTMCNC: 31.1 PG — SIGNIFICANT CHANGE UP (ref 27–34)
MCHC RBC-ENTMCNC: 36.3 GM/DL — HIGH (ref 32–36)
MCV RBC AUTO: 85.8 FL — SIGNIFICANT CHANGE UP (ref 80–100)
NRBC # BLD: 0 /100 WBCS — SIGNIFICANT CHANGE UP (ref 0–0)
PLATELET # BLD AUTO: 122 K/UL — LOW (ref 150–400)
POTASSIUM SERPL-MCNC: 3.2 MMOL/L — LOW (ref 3.5–5.3)
POTASSIUM SERPL-SCNC: 3.2 MMOL/L — LOW (ref 3.5–5.3)
RBC # BLD: 3.95 M/UL — LOW (ref 4.2–5.8)
RBC # FLD: 11.6 % — SIGNIFICANT CHANGE UP (ref 10.3–14.5)
SODIUM SERPL-SCNC: 140 MMOL/L — SIGNIFICANT CHANGE UP (ref 135–145)
VANCOMYCIN TROUGH SERPL-MCNC: 6.8 UG/ML — LOW (ref 10–20)
WBC # BLD: 3.56 K/UL — LOW (ref 3.8–10.5)
WBC # FLD AUTO: 3.56 K/UL — LOW (ref 3.8–10.5)

## 2018-07-30 RX ORDER — SODIUM CHLORIDE 9 MG/ML
1000 INJECTION, SOLUTION INTRAVENOUS ONCE
Qty: 0 | Refills: 0 | Status: COMPLETED | OUTPATIENT
Start: 2018-07-30 | End: 2018-07-30

## 2018-07-30 RX ORDER — DOCUSATE SODIUM 100 MG
100 CAPSULE ORAL
Qty: 0 | Refills: 0 | Status: DISCONTINUED | OUTPATIENT
Start: 2018-07-30 | End: 2018-08-02

## 2018-07-30 RX ORDER — SENNA PLUS 8.6 MG/1
2 TABLET ORAL AT BEDTIME
Qty: 0 | Refills: 0 | Status: DISCONTINUED | OUTPATIENT
Start: 2018-07-30 | End: 2018-08-02

## 2018-07-30 RX ORDER — POTASSIUM CHLORIDE 20 MEQ
10 PACKET (EA) ORAL ONCE
Qty: 0 | Refills: 0 | Status: COMPLETED | OUTPATIENT
Start: 2018-07-30 | End: 2018-07-30

## 2018-07-30 RX ORDER — ALPRAZOLAM 0.25 MG
1 TABLET ORAL AT BEDTIME
Qty: 0 | Refills: 0 | Status: DISCONTINUED | OUTPATIENT
Start: 2018-07-30 | End: 2018-07-30

## 2018-07-30 RX ORDER — ACETAMINOPHEN 500 MG
650 TABLET ORAL EVERY 6 HOURS
Qty: 0 | Refills: 0 | Status: DISCONTINUED | OUTPATIENT
Start: 2018-07-30 | End: 2018-07-31

## 2018-07-30 RX ORDER — ONDANSETRON 8 MG/1
4 TABLET, FILM COATED ORAL EVERY 8 HOURS
Qty: 0 | Refills: 0 | Status: DISCONTINUED | OUTPATIENT
Start: 2018-07-30 | End: 2018-08-03

## 2018-07-30 RX ORDER — DIPHENHYDRAMINE HCL 50 MG
25 CAPSULE ORAL EVERY 6 HOURS
Qty: 0 | Refills: 0 | Status: DISCONTINUED | OUTPATIENT
Start: 2018-07-30 | End: 2018-08-03

## 2018-07-30 RX ORDER — POLYETHYLENE GLYCOL 3350 17 G/17G
17 POWDER, FOR SOLUTION ORAL DAILY
Qty: 0 | Refills: 0 | Status: DISCONTINUED | OUTPATIENT
Start: 2018-07-30 | End: 2018-08-03

## 2018-07-30 RX ORDER — POTASSIUM CHLORIDE 20 MEQ
40 PACKET (EA) ORAL ONCE
Qty: 0 | Refills: 0 | Status: COMPLETED | OUTPATIENT
Start: 2018-07-30 | End: 2018-07-30

## 2018-07-30 RX ADMIN — Medication 100 MILLIEQUIVALENT(S): at 11:55

## 2018-07-30 RX ADMIN — SODIUM CHLORIDE 1000 MILLILITER(S): 9 INJECTION, SOLUTION INTRAVENOUS at 09:18

## 2018-07-30 RX ADMIN — HYDROMORPHONE HYDROCHLORIDE 2 MILLIGRAM(S): 2 INJECTION INTRAMUSCULAR; INTRAVENOUS; SUBCUTANEOUS at 15:32

## 2018-07-30 RX ADMIN — HYDROMORPHONE HYDROCHLORIDE 2 MILLIGRAM(S): 2 INJECTION INTRAMUSCULAR; INTRAVENOUS; SUBCUTANEOUS at 16:00

## 2018-07-30 RX ADMIN — CEFEPIME 100 MILLIGRAM(S): 1 INJECTION, POWDER, FOR SOLUTION INTRAMUSCULAR; INTRAVENOUS at 21:32

## 2018-07-30 RX ADMIN — ENOXAPARIN SODIUM 40 MILLIGRAM(S): 100 INJECTION SUBCUTANEOUS at 11:55

## 2018-07-30 RX ADMIN — Medication 25 MILLIGRAM(S): at 23:24

## 2018-07-30 RX ADMIN — HYDROMORPHONE HYDROCHLORIDE 2 MILLIGRAM(S): 2 INJECTION INTRAMUSCULAR; INTRAVENOUS; SUBCUTANEOUS at 01:27

## 2018-07-30 RX ADMIN — SODIUM CHLORIDE 75 MILLILITER(S): 9 INJECTION INTRAMUSCULAR; INTRAVENOUS; SUBCUTANEOUS at 21:31

## 2018-07-30 RX ADMIN — HYDROMORPHONE HYDROCHLORIDE 2 MILLIGRAM(S): 2 INJECTION INTRAMUSCULAR; INTRAVENOUS; SUBCUTANEOUS at 21:49

## 2018-07-30 RX ADMIN — Medication 250 MILLIGRAM(S): at 21:31

## 2018-07-30 RX ADMIN — Medication 650 MILLIGRAM(S): at 11:55

## 2018-07-30 RX ADMIN — Medication 650 MILLIGRAM(S): at 05:07

## 2018-07-30 RX ADMIN — HYDROMORPHONE HYDROCHLORIDE 2 MILLIGRAM(S): 2 INJECTION INTRAMUSCULAR; INTRAVENOUS; SUBCUTANEOUS at 09:09

## 2018-07-30 RX ADMIN — SODIUM CHLORIDE 75 MILLILITER(S): 9 INJECTION INTRAMUSCULAR; INTRAVENOUS; SUBCUTANEOUS at 01:29

## 2018-07-30 RX ADMIN — HYDROMORPHONE HYDROCHLORIDE 2 MILLIGRAM(S): 2 INJECTION INTRAMUSCULAR; INTRAVENOUS; SUBCUTANEOUS at 10:00

## 2018-07-30 RX ADMIN — CEFEPIME 100 MILLIGRAM(S): 1 INJECTION, POWDER, FOR SOLUTION INTRAMUSCULAR; INTRAVENOUS at 15:32

## 2018-07-30 RX ADMIN — ONDANSETRON 4 MILLIGRAM(S): 8 TABLET, FILM COATED ORAL at 15:32

## 2018-07-30 RX ADMIN — CEFEPIME 100 MILLIGRAM(S): 1 INJECTION, POWDER, FOR SOLUTION INTRAMUSCULAR; INTRAVENOUS at 00:15

## 2018-07-30 RX ADMIN — CEFEPIME 100 MILLIGRAM(S): 1 INJECTION, POWDER, FOR SOLUTION INTRAMUSCULAR; INTRAVENOUS at 05:04

## 2018-07-30 RX ADMIN — HYDROMORPHONE HYDROCHLORIDE 2 MILLIGRAM(S): 2 INJECTION INTRAMUSCULAR; INTRAVENOUS; SUBCUTANEOUS at 01:47

## 2018-07-30 RX ADMIN — Medication 110 MILLIGRAM(S): at 15:31

## 2018-07-30 RX ADMIN — Medication 650 MILLIGRAM(S): at 23:24

## 2018-07-30 RX ADMIN — Medication 650 MILLIGRAM(S): at 19:02

## 2018-07-30 RX ADMIN — Medication 1 MILLIGRAM(S): at 23:24

## 2018-07-30 RX ADMIN — HYDROMORPHONE HYDROCHLORIDE 2 MILLIGRAM(S): 2 INJECTION INTRAMUSCULAR; INTRAVENOUS; SUBCUTANEOUS at 21:34

## 2018-07-30 RX ADMIN — Medication 40 MILLIEQUIVALENT(S): at 09:19

## 2018-07-30 RX ADMIN — Medication 250 MILLIGRAM(S): at 05:04

## 2018-07-30 NOTE — PHYSICAL THERAPY INITIAL EVALUATION ADULT - ACTIVE RANGE OF MOTION EXAMINATION, REHAB EVAL
deficits as listed below/unable to assees cervical ROM secondary to pain. All other extremities WFL. unable to asses cervical ROM secondary to pain. All other extremities WFL./deficits as listed below

## 2018-07-30 NOTE — PHYSICAL THERAPY INITIAL EVALUATION ADULT - CRITERIA FOR SKILLED THERAPEUTIC INTERVENTIONS
home with outpatient PT/impairments found/therapy frequency/risk reduction/prevention/anticipated discharge recommendation/functional limitations in following categories/predicted duration of therapy intervention

## 2018-07-30 NOTE — PHYSICAL THERAPY INITIAL EVALUATION ADULT - RANGE OF MOTION EXAMINATION, REHAB EVAL
deficits as listed below/unable to assees cervical ROM secondary to pain. All other extremities WFL. deficits as listed below/unable to asses cervical ROM secondary to pain. All other extremities WFL.

## 2018-07-31 LAB
ALBUMIN SERPL ELPH-MCNC: 2.6 G/DL — LOW (ref 3.3–5)
ALP SERPL-CCNC: 90 U/L — SIGNIFICANT CHANGE UP (ref 40–120)
ALT FLD-CCNC: 39 U/L — SIGNIFICANT CHANGE UP (ref 12–78)
ANION GAP SERPL CALC-SCNC: 8 MMOL/L — SIGNIFICANT CHANGE UP (ref 5–17)
AST SERPL-CCNC: 59 U/L — HIGH (ref 15–37)
B BURGDOR C6 AB SER-ACNC: NEGATIVE — SIGNIFICANT CHANGE UP
B BURGDOR IGG+IGM SER-ACNC: 0.01 INDEX — SIGNIFICANT CHANGE UP (ref 0.01–0.89)
BILIRUB SERPL-MCNC: 0.8 MG/DL — SIGNIFICANT CHANGE UP (ref 0.2–1.2)
BUN SERPL-MCNC: 7 MG/DL — SIGNIFICANT CHANGE UP (ref 7–23)
CALCIUM SERPL-MCNC: 7.9 MG/DL — LOW (ref 8.5–10.1)
CHLORIDE SERPL-SCNC: 106 MMOL/L — SIGNIFICANT CHANGE UP (ref 96–108)
CO2 SERPL-SCNC: 26 MMOL/L — SIGNIFICANT CHANGE UP (ref 22–31)
CREAT SERPL-MCNC: 1.04 MG/DL — SIGNIFICANT CHANGE UP (ref 0.5–1.3)
CRP SERPL-MCNC: 8.7 MG/DL — HIGH (ref 0–0.4)
GLUCOSE SERPL-MCNC: 154 MG/DL — HIGH (ref 70–99)
HCT VFR BLD CALC: 33.3 % — LOW (ref 39–50)
HGB BLD-MCNC: 12.1 G/DL — LOW (ref 13–17)
LYME C6 AB IGG/IGM EIA REFLEX WESTERN BL: SIGNIFICANT CHANGE UP
MCHC RBC-ENTMCNC: 31.1 PG — SIGNIFICANT CHANGE UP (ref 27–34)
MCHC RBC-ENTMCNC: 36.3 GM/DL — HIGH (ref 32–36)
MCV RBC AUTO: 85.6 FL — SIGNIFICANT CHANGE UP (ref 80–100)
NRBC # BLD: 0 /100 WBCS — SIGNIFICANT CHANGE UP (ref 0–0)
PLATELET # BLD AUTO: 139 K/UL — LOW (ref 150–400)
POTASSIUM SERPL-MCNC: 3.5 MMOL/L — SIGNIFICANT CHANGE UP (ref 3.5–5.3)
POTASSIUM SERPL-SCNC: 3.5 MMOL/L — SIGNIFICANT CHANGE UP (ref 3.5–5.3)
PROT SERPL-MCNC: 5.4 GM/DL — LOW (ref 6–8.3)
RBC # BLD: 3.89 M/UL — LOW (ref 4.2–5.8)
RBC # FLD: 11.8 % — SIGNIFICANT CHANGE UP (ref 10.3–14.5)
SODIUM SERPL-SCNC: 140 MMOL/L — SIGNIFICANT CHANGE UP (ref 135–145)
WBC # BLD: 3.6 K/UL — LOW (ref 3.8–10.5)
WBC # FLD AUTO: 3.6 K/UL — LOW (ref 3.8–10.5)

## 2018-07-31 PROCEDURE — 74177 CT ABD & PELVIS W/CONTRAST: CPT | Mod: 26

## 2018-07-31 PROCEDURE — 71260 CT THORAX DX C+: CPT | Mod: 26

## 2018-07-31 RX ORDER — IOHEXOL 300 MG/ML
30 INJECTION, SOLUTION INTRAVENOUS ONCE
Qty: 0 | Refills: 0 | Status: COMPLETED | OUTPATIENT
Start: 2018-07-31 | End: 2018-07-31

## 2018-07-31 RX ORDER — NYSTATIN CREAM 100000 [USP'U]/G
1 CREAM TOPICAL
Qty: 0 | Refills: 0 | Status: DISCONTINUED | OUTPATIENT
Start: 2018-07-31 | End: 2018-08-03

## 2018-07-31 RX ORDER — HYDROMORPHONE HYDROCHLORIDE 2 MG/ML
2 INJECTION INTRAMUSCULAR; INTRAVENOUS; SUBCUTANEOUS EVERY 6 HOURS
Qty: 0 | Refills: 0 | Status: DISCONTINUED | OUTPATIENT
Start: 2018-07-31 | End: 2018-08-03

## 2018-07-31 RX ORDER — ACETAMINOPHEN 500 MG
650 TABLET ORAL EVERY 6 HOURS
Qty: 0 | Refills: 0 | Status: DISCONTINUED | OUTPATIENT
Start: 2018-07-31 | End: 2018-08-02

## 2018-07-31 RX ORDER — OXYCODONE AND ACETAMINOPHEN 5; 325 MG/1; MG/1
1 TABLET ORAL EVERY 6 HOURS
Qty: 0 | Refills: 0 | Status: DISCONTINUED | OUTPATIENT
Start: 2018-07-31 | End: 2018-08-03

## 2018-07-31 RX ADMIN — Medication 110 MILLIGRAM(S): at 17:34

## 2018-07-31 RX ADMIN — Medication 110 MILLIGRAM(S): at 05:31

## 2018-07-31 RX ADMIN — OXYCODONE AND ACETAMINOPHEN 1 TABLET(S): 5; 325 TABLET ORAL at 12:44

## 2018-07-31 RX ADMIN — CEFEPIME 100 MILLIGRAM(S): 1 INJECTION, POWDER, FOR SOLUTION INTRAMUSCULAR; INTRAVENOUS at 15:37

## 2018-07-31 RX ADMIN — Medication 250 MILLIGRAM(S): at 10:22

## 2018-07-31 RX ADMIN — HYDROMORPHONE HYDROCHLORIDE 2 MILLIGRAM(S): 2 INJECTION INTRAMUSCULAR; INTRAVENOUS; SUBCUTANEOUS at 05:48

## 2018-07-31 RX ADMIN — HYDROMORPHONE HYDROCHLORIDE 2 MILLIGRAM(S): 2 INJECTION INTRAMUSCULAR; INTRAVENOUS; SUBCUTANEOUS at 16:20

## 2018-07-31 RX ADMIN — Medication 650 MILLIGRAM(S): at 15:37

## 2018-07-31 RX ADMIN — Medication 250 MILLIGRAM(S): at 20:12

## 2018-07-31 RX ADMIN — IOHEXOL 30 MILLILITER(S): 300 INJECTION, SOLUTION INTRAVENOUS at 12:44

## 2018-07-31 RX ADMIN — HYDROMORPHONE HYDROCHLORIDE 2 MILLIGRAM(S): 2 INJECTION INTRAMUSCULAR; INTRAVENOUS; SUBCUTANEOUS at 05:38

## 2018-07-31 RX ADMIN — Medication 650 MILLIGRAM(S): at 05:31

## 2018-07-31 RX ADMIN — HYDROMORPHONE HYDROCHLORIDE 2 MILLIGRAM(S): 2 INJECTION INTRAMUSCULAR; INTRAVENOUS; SUBCUTANEOUS at 21:34

## 2018-07-31 RX ADMIN — ONDANSETRON 4 MILLIGRAM(S): 8 TABLET, FILM COATED ORAL at 05:37

## 2018-07-31 RX ADMIN — CEFEPIME 100 MILLIGRAM(S): 1 INJECTION, POWDER, FOR SOLUTION INTRAMUSCULAR; INTRAVENOUS at 21:18

## 2018-07-31 RX ADMIN — OXYCODONE AND ACETAMINOPHEN 1 TABLET(S): 5; 325 TABLET ORAL at 13:32

## 2018-07-31 RX ADMIN — HYDROMORPHONE HYDROCHLORIDE 2 MILLIGRAM(S): 2 INJECTION INTRAMUSCULAR; INTRAVENOUS; SUBCUTANEOUS at 21:19

## 2018-07-31 RX ADMIN — NYSTATIN CREAM 1 APPLICATION(S): 100000 CREAM TOPICAL at 18:38

## 2018-07-31 RX ADMIN — HYDROMORPHONE HYDROCHLORIDE 2 MILLIGRAM(S): 2 INJECTION INTRAMUSCULAR; INTRAVENOUS; SUBCUTANEOUS at 15:37

## 2018-07-31 RX ADMIN — CEFEPIME 100 MILLIGRAM(S): 1 INJECTION, POWDER, FOR SOLUTION INTRAMUSCULAR; INTRAVENOUS at 05:31

## 2018-07-31 RX ADMIN — SODIUM CHLORIDE 75 MILLILITER(S): 9 INJECTION INTRAMUSCULAR; INTRAVENOUS; SUBCUTANEOUS at 15:37

## 2018-08-01 LAB
A PHAGOCYTOPH IGG TITR SER IF: SIGNIFICANT CHANGE UP
A PHAGOCYTOPH IGM SER QL: SIGNIFICANT CHANGE UP
A PHAGOCYTOPH IGM SER QL: SIGNIFICANT CHANGE UP
A PHAGOCYTOPH IGM TITR SER IF: SIGNIFICANT CHANGE UP
ALBUMIN SERPL ELPH-MCNC: 2.5 G/DL — LOW (ref 3.3–5)
ALP SERPL-CCNC: 117 U/L — SIGNIFICANT CHANGE UP (ref 40–120)
ALT FLD-CCNC: 82 U/L — HIGH (ref 12–78)
ANAPLASMA PHAGOCYTO IGM COMENT: SIGNIFICANT CHANGE UP
ANAPLASMA PHAGOCYTO IGM INTERP: SIGNIFICANT CHANGE UP
ANION GAP SERPL CALC-SCNC: 10 MMOL/L — SIGNIFICANT CHANGE UP (ref 5–17)
AST SERPL-CCNC: 130 U/L — HIGH (ref 15–37)
B MICROTI DNA BLD QL NAA+PROBE: NEGATIVE — SIGNIFICANT CHANGE UP
BABESIA DNA SPEC QL NAA+PROBE: NEGATIVE — SIGNIFICANT CHANGE UP
BABESIA DNA SPEC QL NAA+PROBE: NEGATIVE — SIGNIFICANT CHANGE UP
BILIRUB SERPL-MCNC: 0.7 MG/DL — SIGNIFICANT CHANGE UP (ref 0.2–1.2)
BUN SERPL-MCNC: 6 MG/DL — LOW (ref 7–23)
CALCIUM SERPL-MCNC: 8.2 MG/DL — LOW (ref 8.5–10.1)
CHLORIDE SERPL-SCNC: 106 MMOL/L — SIGNIFICANT CHANGE UP (ref 96–108)
CO2 SERPL-SCNC: 27 MMOL/L — SIGNIFICANT CHANGE UP (ref 22–31)
CREAT SERPL-MCNC: 1.01 MG/DL — SIGNIFICANT CHANGE UP (ref 0.5–1.3)
CRP SERPL-MCNC: 5.45 MG/DL — HIGH (ref 0–0.4)
E CHAFFEENSIS IGG TITR SER IF: SIGNIFICANT CHANGE UP
E CHAFFEENSIS IGG TITR SER IF: SIGNIFICANT CHANGE UP
E CHAFFEENSIS IGG TITR SER: SIGNIFICANT CHANGE UP
E CHAFFEENSIS IGM TITR SER IF: SIGNIFICANT CHANGE UP
EHRLICHIA CHAFFEENSIS IGG COMENT: SIGNIFICANT CHANGE UP
EHRLICHIA CHAFFEENSIS IGG INTERP: SIGNIFICANT CHANGE UP
GLUCOSE SERPL-MCNC: 158 MG/DL — HIGH (ref 70–99)
HCT VFR BLD CALC: 33 % — LOW (ref 39–50)
HGB BLD-MCNC: 11.7 G/DL — LOW (ref 13–17)
MCHC RBC-ENTMCNC: 30.5 PG — SIGNIFICANT CHANGE UP (ref 27–34)
MCHC RBC-ENTMCNC: 35.5 GM/DL — SIGNIFICANT CHANGE UP (ref 32–36)
MCV RBC AUTO: 85.9 FL — SIGNIFICANT CHANGE UP (ref 80–100)
NRBC # BLD: 0 /100 WBCS — SIGNIFICANT CHANGE UP (ref 0–0)
PLATELET # BLD AUTO: 173 K/UL — SIGNIFICANT CHANGE UP (ref 150–400)
POTASSIUM SERPL-MCNC: 3.4 MMOL/L — LOW (ref 3.5–5.3)
POTASSIUM SERPL-SCNC: 3.4 MMOL/L — LOW (ref 3.5–5.3)
PROT SERPL-MCNC: 5.7 GM/DL — LOW (ref 6–8.3)
RBC # BLD: 3.84 M/UL — LOW (ref 4.2–5.8)
RBC # FLD: 11.7 % — SIGNIFICANT CHANGE UP (ref 10.3–14.5)
SODIUM SERPL-SCNC: 143 MMOL/L — SIGNIFICANT CHANGE UP (ref 135–145)
VANCOMYCIN TROUGH SERPL-MCNC: 7.5 UG/ML — LOW (ref 10–20)
WBC # BLD: 5.76 K/UL — SIGNIFICANT CHANGE UP (ref 3.8–10.5)
WBC # FLD AUTO: 5.76 K/UL — SIGNIFICANT CHANGE UP (ref 3.8–10.5)

## 2018-08-01 RX ORDER — MORPHINE SULFATE 50 MG/1
2 CAPSULE, EXTENDED RELEASE ORAL ONCE
Qty: 0 | Refills: 0 | Status: DISCONTINUED | OUTPATIENT
Start: 2018-08-01 | End: 2018-08-01

## 2018-08-01 RX ORDER — VANCOMYCIN HCL 1 G
1250 VIAL (EA) INTRAVENOUS EVERY 12 HOURS
Qty: 0 | Refills: 0 | Status: DISCONTINUED | OUTPATIENT
Start: 2018-08-01 | End: 2018-08-01

## 2018-08-01 RX ORDER — VANCOMYCIN HCL 1 G
1250 VIAL (EA) INTRAVENOUS
Qty: 0 | Refills: 0 | Status: DISCONTINUED | OUTPATIENT
Start: 2018-08-01 | End: 2018-08-03

## 2018-08-01 RX ORDER — DIPHENOXYLATE HCL/ATROPINE 2.5-.025MG
1 TABLET ORAL
Qty: 0 | Refills: 0 | Status: DISCONTINUED | OUTPATIENT
Start: 2018-08-01 | End: 2018-08-03

## 2018-08-01 RX ORDER — ALPRAZOLAM 0.25 MG
1 TABLET ORAL AT BEDTIME
Qty: 0 | Refills: 0 | Status: DISCONTINUED | OUTPATIENT
Start: 2018-08-01 | End: 2018-08-01

## 2018-08-01 RX ORDER — CEFEPIME 1 G/1
2000 INJECTION, POWDER, FOR SOLUTION INTRAMUSCULAR; INTRAVENOUS EVERY 8 HOURS
Qty: 0 | Refills: 0 | Status: DISCONTINUED | OUTPATIENT
Start: 2018-08-01 | End: 2018-08-03

## 2018-08-01 RX ADMIN — MORPHINE SULFATE 2 MILLIGRAM(S): 50 CAPSULE, EXTENDED RELEASE ORAL at 14:57

## 2018-08-01 RX ADMIN — HYDROMORPHONE HYDROCHLORIDE 2 MILLIGRAM(S): 2 INJECTION INTRAMUSCULAR; INTRAVENOUS; SUBCUTANEOUS at 05:47

## 2018-08-01 RX ADMIN — SODIUM CHLORIDE 75 MILLILITER(S): 9 INJECTION INTRAMUSCULAR; INTRAVENOUS; SUBCUTANEOUS at 05:41

## 2018-08-01 RX ADMIN — Medication 650 MILLIGRAM(S): at 02:31

## 2018-08-01 RX ADMIN — NYSTATIN CREAM 1 APPLICATION(S): 100000 CREAM TOPICAL at 17:14

## 2018-08-01 RX ADMIN — Medication 650 MILLIGRAM(S): at 19:23

## 2018-08-01 RX ADMIN — MORPHINE SULFATE 2 MILLIGRAM(S): 50 CAPSULE, EXTENDED RELEASE ORAL at 15:10

## 2018-08-01 RX ADMIN — Medication 110 MILLIGRAM(S): at 17:10

## 2018-08-01 RX ADMIN — HYDROMORPHONE HYDROCHLORIDE 2 MILLIGRAM(S): 2 INJECTION INTRAMUSCULAR; INTRAVENOUS; SUBCUTANEOUS at 05:42

## 2018-08-01 RX ADMIN — CEFEPIME 100 MILLIGRAM(S): 1 INJECTION, POWDER, FOR SOLUTION INTRAMUSCULAR; INTRAVENOUS at 14:57

## 2018-08-01 RX ADMIN — Medication 1 MILLIGRAM(S): at 21:43

## 2018-08-01 RX ADMIN — HYDROMORPHONE HYDROCHLORIDE 2 MILLIGRAM(S): 2 INJECTION INTRAMUSCULAR; INTRAVENOUS; SUBCUTANEOUS at 23:55

## 2018-08-01 RX ADMIN — NYSTATIN CREAM 1 APPLICATION(S): 100000 CREAM TOPICAL at 05:47

## 2018-08-01 RX ADMIN — ONDANSETRON 4 MILLIGRAM(S): 8 TABLET, FILM COATED ORAL at 02:33

## 2018-08-01 RX ADMIN — CEFEPIME 100 MILLIGRAM(S): 1 INJECTION, POWDER, FOR SOLUTION INTRAMUSCULAR; INTRAVENOUS at 05:42

## 2018-08-01 RX ADMIN — Medication 166.67 MILLIGRAM(S): at 21:49

## 2018-08-01 RX ADMIN — CEFEPIME 100 MILLIGRAM(S): 1 INJECTION, POWDER, FOR SOLUTION INTRAMUSCULAR; INTRAVENOUS at 21:43

## 2018-08-01 RX ADMIN — ONDANSETRON 4 MILLIGRAM(S): 8 TABLET, FILM COATED ORAL at 10:56

## 2018-08-01 RX ADMIN — Medication 110 MILLIGRAM(S): at 05:42

## 2018-08-01 NOTE — CONSULT NOTE ADULT - SUBJECTIVE AND OBJECTIVE BOX
48M hx of cervical spine surgery jul 2 in [place] by dr aviles  discharge next day   has been fine till 3 days prior to admission left;; not much reeif of pain   just a different kind of pain VS before   same amount of dilaudid pre and post sx ;; same amount of xanax and lomotil    admitted saturday night with 2-3 days of fever and increasing pain in the cervical spine region where the surgery took place.   patient had no nausea, vomiting, cough, abd pain, dysuria, rhinorrhea, rash or bleeding. Patient denies numbness, tingling or weakness of the upper or lower extremity and denies retention or incontinence of the bowel or bladder. patient has been taking tylenol for the fever intermittently and hydromorphone for pain.   today after potassium severe nausea       PAST MEDICAL & SURGICAL HISTORY:  Anxiety  Irritable bowel syndrome  H/O laminectomy      SOCHX:  no  tobacco,  - no  alcohol    FMHX: FA/MO  -non  contributory       Recent Travel:    Immunizations:    Allergies    tetracycline (Hives)    Intolerances        MEDICATIONS  (STANDING):  acetaminophen   Tablet 650 milliGRAM(s) Oral every 6 hours  cefepime   IVPB      cefepime   IVPB 2000 milliGRAM(s) IV Intermittent every 8 hours  docusate sodium 100 milliGRAM(s) Oral two times a day  enoxaparin Injectable 40 milliGRAM(s) SubCutaneous daily  senna 2 Tablet(s) Oral at bedtime  sodium chloride 0.9%. 1000 milliLiter(s) (75 mL/Hr) IV Continuous <Continuous>  vancomycin  IVPB 1000 milliGRAM(s) IV Intermittent every 12 hours    MEDICATIONS  (PRN):  acetaminophen   Tablet. 650 milliGRAM(s) Oral every 6 hours PRN Mild Pain (1 - 3)  diazepam    Tablet 5 milliGRAM(s) Oral two times a day PRN anxiety  diphenhydrAMINE   Capsule 25 milliGRAM(s) Oral every 6 hours PRN Rash and/or Itching  HYDROmorphone  Injectable 2 milliGRAM(s) IV Push every 6 hours PRN Severe Pain (7 - 10)  ondansetron Injectable 4 milliGRAM(s) IV Push every 8 hours PRN Nausea and/or Vomiting  oxyCODONE    5 mG/acetaminophen 325 mG 1 Tablet(s) Oral every 6 hours PRN Moderate Pain (4 - 6)  polyethylene glycol 3350 17 Gram(s) Oral daily PRN Constipation      REVIEW OF SYSTEMS:  CONSTITUTIONAL:  fever,  no  weight loss, or fatigue  EYES: No eye pain, visual disturbances, or discharge  ENMT:  No difficulty hearing, tinnitus, vertigo; No sinus or throat pain  NECK: No pain or stiffness  RESPIRATORY: No cough, wheezing, chills or hemoptysis; No shortness of breath  CARDIOVASCULAR: No chest pain, palpitations, dizziness, or leg swelling  GASTROINTESTINAL: No abdominal or epigastric pain. No nausea, vomiting, or hematemesis; No diarrhea or constipation. No melena or hematochezia.  GENITOURINARY: No dysuria, frequency, hematuria, or incontinence  NEUROLOGICAL: No headaches, memory loss, loss of strength, numbness, or tremors  has pain in his arm on lifting shoulder left more than right   SKIN: No itching, burning, rashes, or lesions   apparently neck was hotter   LYMPH NODES: No enlarged glands  ENDOCRINE: No heat or cold intolerance; No hair loss  MUSCULOSKELETAL: No joint pain or swelling; No muscle, back, or extremity pain  PSYCHIATRIC: h/o  anxiety,   had mood swings on oxycontin ; seem to be gone x 1 year since on dilaudid    no  difficulty sleeping  does not require more dilaudid than before   HEME/LYMPH: No easy bruising, or bleeding gums  ALLERGY AND IMMUNOLOGIC: No hives or eczema    VITAL SIGNS:    T(C): 38.9 (18 @ 11:44), Max: 40.1 (18 @ 17:00)  T(F): 102 (18 @ 11:44), Max: 104.1 (18 @ 17:00)  HR: 109 (18 @ :44) (87 - 112)  BP: 131/87 (18 @ 11:44) (108/73 - 146/86)  RR: 17 (18 @ :44) (15 - 17)  SpO2: 97% (18 @ :44) (96% - 98%)    PHYSICAL EXAM:    GENERAL: NAD, well-groomed, well-developed  HEAD:  Atraumatic, Normocephalic  EYES: EOMI, PERRLA, conjunctiva and sclera clear  ENMT: No tonsillar erythema, exudates, or enlargement; Moist mucous membranes, Good dentition, No lesions  NECK:  decreased ROM , No JVD, Normal thyroid  i dont see any visible signs and symptoms of infection   NERVOUS SYSTEM:  Alert & Oriented X3, Good concentration; Motor Strength 5/5 B/L upper and lower extremities;  CHEST/LUNG: Clear to auscultation bilaterally; No rales, rhonchi, wheezing bilaterally  HEART: Regular rate and rhythm; No murmurs, rubs, or gallops  ABDOMEN: Soft, Nontender, Nondistended; Bowel sounds present  EXTREMITIES:  2+ Peripheral Pulses, No clubbing, cyanosis, or edema  LYMPH: No lymphadenopathy noted  SKIN: No rashes or lesions  no pow infection  BACK: no pressor sore     LABS:                         12.3   3.56  )-----------( 122      ( 2018 07:34 )             33.9         140  |  106  |  10  ----------------------------<  151<H>  3.2<L>   |  24  |  1.18    Ca    7.7<L>      2018 07:34  Mg     1.9         TPro  5.4<L>  /  Alb  2.8<L>  /  TBili  0.7  /  DBili  x   /  AST  41<H>  /  ALT  31  /  AlkPhos  86      LIVER FUNCTIONS - ( 2018 07:19 )  Alb: 2.8 g/dL / Pro: 5.4 gm/dL / ALK PHOS: 86 U/L / ALT: 31 U/L / AST: 41 U/L / GGT: x           PT/INR - ( 2018 15:53 )   PT: 11.9 sec;   INR: 1.09 ratio         PTT - ( 2018 15:53 )  PTT:30.8 sec  Urinalysis Basic - ( 2018 17:18 )    Color: Yellow / Appearance: Clear / S.020 / pH: x  Gluc: x / Ketone: Trace  / Bili: Negative / Urobili: Negative mg/dL   Blood: x / Protein: 15 mg/dL / Nitrite: Negative   Leuk Esterase: Negative / RBC: 0-2 /HPF / WBC 0-2   Sq Epi: x / Non Sq Epi: Occasional / Bacteria: Moderate                Sedimentation Rate, Erythrocyte: 7 mm/hr ( @ 21:27)  Sedimentation Rate, Erythrocyte: 10 mm/hr ( @ 15:53)            Culture Results:   No growth to date. ( @ 21:59)  Culture Results:   No growth to date. ( @ 21:59)  Culture Results:   No growth ( @ 21:46)              < from: MR Cervical Spine No Cont (18 @ 16:20) >  mpression: Postop changes as described above. Contrast enhanced MRI of   the cervical spine can be done for further evaluation if clinically   indicated.                < end of copied text >    Radiology:
Pt is a 49 y/o male with a PMHx of IBS s/p C4-C7 laminectomy on 7/2 presenting with fever. Last fever was 101.9 at 0520 on 8/1. Symptoms started a few days ago. He was evaluated by urgent care and told to come to the hospital in the setting of fever with recent spinal surgery. He endorses frequent loose stools, but this is normal for his history of IBS. He endorsed intermittent, non-specific headaches but no pain with neck flexion. Pt denies neck pain or pain around the incision site, denies numbness/tingling, weakness. He further denies CP, SOB, N/V, calf pain, photophobia.    PMHx: IBS    PSHx: C4-C7 laminectomy                          11.7   5.76  )-----------( 173      ( 01 Aug 2018 07:47 )             33.0     08-01    143  |  106  |  6<L>  ----------------------------<  158<H>  3.4<L>   |  27  |  1.01    Ca    8.2<L>      01 Aug 2018 07:47    TPro  5.7<L>  /  Alb  2.5<L>  /  TBili  0.7  /  DBili  x   /  AST  130<H>  /  ALT  82<H>  /  AlkPhos  117  08-01    Vital Signs Last 24 Hrs  T(C): 37.9 (01 Aug 2018 11:14), Max: 38.8 (01 Aug 2018 02:15)  T(F): 100.3 (01 Aug 2018 11:14), Max: 101.9 (01 Aug 2018 02:15)  HR: 99 (01 Aug 2018 11:14) (95 - 99)  BP: 125/80 (01 Aug 2018 11:14) (112/75 - 129/75)  RR: 17 (01 Aug 2018 11:14) (17 - 18)  SpO2: 98% (01 Aug 2018 11:14) (96% - 98%)    PE:  General: no acute distress, resting comfortably in bed, AAOx3    Neck:  Well-healing skin incision, non-fluctuant  Nontender to palpation about incision site or neck  Nontender with neck/flexion extension  Negative L'hermitte's sign    UE:  Skin intact  Ax/Msc/Rad/Uln/Med/AIN/PIN 5/5  SILT C5-T1  Rad/Uln pulses 2+  Reflexes 2+ b/l  Negative Bettencourt's sign    LE:  Skin intact  Q/H/TA/Gastroc/EHL/FHL 5/5  SILT L2-S1  DP/PT pulses 2+ b/l  Reflexes 2+ b/l  Negative babinski sign    Imaging:  CT Chest/Abd/Pelv: Negative for chest pathology. Possible pyelo.  MRI C Spine: No fluid collection. Normal post-operative changes.

## 2018-08-01 NOTE — CONSULT NOTE ADULT - ASSESSMENT
fever   leucopenic and thrombocytopenic now   sepsis VS any tick induced infection   no tick bites;; has a dog   wife has lyme but he has been NEGATIVE   but this is not lyme   need spine sx to evaluate   may need LP   today severe nausea from ?? kcl   severe arm burnoing from kcl : stat VS now 101.2 ; heart rate 90;; blood pressure 117/73  esr crp too low for discitis   will get ct abdominal and pelvis as well
Subjective Complaints:      Consult requested by ER doctor:                  Attending:     History of Present Illness:  Chief Complaint/Reason for Admission:  History of Present Illness:  HPI:  presents  with  fever  for  3 days  had  cervical  spine laminectomy  3  weeks  ago evaluated  in  urgent care center  advised to  go  to  Er  evaluated  in  Er  found  to  have  sepsis  admitted  for  further  w/u  and  treatment (28 Jul 2018 19:16)        PAST MEDICAL & SURGICAL HISTORY:  Anxiety  Irritable bowel syndrome  H/O laminectomy  48yMale    MEDICATIONS  (STANDING):  ALPRAZolam 1 milliGRAM(s) Oral at bedtime  cefepime   IVPB 2000 milliGRAM(s) IV Intermittent every 8 hours  diphenoxylate/atropine 1 Tablet(s) Oral two times a day  docusate sodium 100 milliGRAM(s) Oral two times a day  doxycycline IVPB      doxycycline IVPB 100 milliGRAM(s) IV Intermittent every 12 hours  enoxaparin Injectable 40 milliGRAM(s) SubCutaneous daily  HYDROmorphone   Tablet 2 milliGRAM(s) Oral every 6 hours  nystatin Cream 1 Application(s) Topical two times a day  senna 2 Tablet(s) Oral at bedtime  sodium chloride 0.9%. 1000 milliLiter(s) (75 mL/Hr) IV Continuous <Continuous>  vancomycin  IVPB 1250 milliGRAM(s) IV Intermittent <User Schedule>    MEDICATIONS  (PRN):  acetaminophen   Tablet 650 milliGRAM(s) Oral every 6 hours PRN For Temp greater than 38 C (100.4 F)  acetaminophen   Tablet. 650 milliGRAM(s) Oral every 6 hours PRN Mild Pain (1 - 3)  diazepam    Tablet 5 milliGRAM(s) Oral two times a day PRN anxiety  diphenhydrAMINE   Capsule 25 milliGRAM(s) Oral every 6 hours PRN Rash and/or Itching  ondansetron Injectable 4 milliGRAM(s) IV Push every 8 hours PRN Nausea and/or Vomiting  oxyCODONE    5 mG/acetaminophen 325 mG 1 Tablet(s) Oral every 6 hours PRN Moderate Pain (4 - 6)  polyethylene glycol 3350 17 Gram(s) Oral daily PRN Constipation      Allergies    tetracycline (Hives)    Intolerances      FAMILY HISTORY:      REVIEW OF SYSTEMS:  General:  No wt loss, fevers, chills, night sweats  Eyes:  Good vision, no reported pain  ENT:  No sore throat, pain, runny nose, dysphagia  CV:  No pain, palpitatioins, hypo/hypertension  Resp:  No dyspnea, cough, tachypnea, wheezing  GI:  No pain, nausea, vomiting, diarrhea, constipatiion  :  No pain, bleeding, incontinence, nocturia  Muscle:  No pain, weakness  Breast:  No pain, abscess, mass, discharge  Neuro:  No weakness, tingling, memory problems  Psych:  No fatigue, insomnia, mood problems, depression  Endocrine:  No polyuria, polydypsia, cold/heat intolerance  Heme:  No petechiae, ecchymosis, easy bruisability  Skin:  No rash, tattoos, scars, edema      Vital Signs Last 24 Hrs  T(C): 38.2 (01 Aug 2018 19:41), Max: 38.8 (01 Aug 2018 02:15)  T(F): 100.7 (01 Aug 2018 19:41), Max: 101.9 (01 Aug 2018 02:15)  HR: 97 (01 Aug 2018 17:59) (95 - 99)  BP: 128/83 (01 Aug 2018 17:59) (112/75 - 128/83)  BP(mean): --  RR: 21 (01 Aug 2018 17:59) (17 - 21)  SpO2: 96% (01 Aug 2018 17:59) (96% - 98%)    GENERAL PHYSICAL EXAM:  General:  Appears stated age, well-groomed, well-nourished, no distress  HEENT:  NC/AT, patent nares w/ pink mucosa, OP clear w/o lesions, PERRL, EOMI, conjunctivae clear, no thyromegaly, nodules, adenopathy, no JVD  Chest:  Full & symmetric excursion, no increased effort, breath sounds clear  Cardiovascular:  Regular rhythm, S1, S2, no murmur/rub/S3/S4, no carotid/femoral/abdominal bruit, radial/pedal pulses 2+, no edema  Abdomen:  Soft, non-tender, non-distended, normoactive bowel sounds, no HSM  Extremities:  Gait & station:   Digits:   Nails:   Joints, Bones, Muscles:   ROM:   Stability:  Skin:  No rash/erythema/ecchymoses/petechiae/wounds/abscess/warm/dry  Musculoskeletal:  Full ROM in all joints w/o swelling/tenderness/effusion    NEUROLOGICAL EXAM:  HENT:  Normocephalic head; atraumatic head.  Neck supple.  ENT: normal looking.  Mental State:    Alert.  Fully oriented to person, place and date.  Coherent.  Speech clear and intact.  Cooperative.  Responds appropriately.    Cranial Nerves:  II-XII:   Pupils round and reactive to light and accommodation.  Extraocular movements full.  Visual fields full (no homonymous hemianopsia).  Visual acuity wnl.  Facial symmetry intact.  Tongue midline.  Motor Functions:  Moves all extremities.  No pronator drift of UE.  Claps hand well.  Hand  intact bilaterally.  Ambulatory.    Sensory Functions:   Intact to touch and pinprick to face and extremities.    Reflexes:  Deep tendon reflexes normoactive to biceps, knees and ankles.  Babinski absent (present).  Cerebellar Testing:    Finger to nose intact.  Nystagmus absent.  Neurovascular: Carotid auscultation full without bruits.      LABS:                        11.7   5.76  )-----------( 173      ( 01 Aug 2018 07:47 )             33.0     08-01    143  |  106  |  6<L>  ----------------------------<  158<H>  3.4<L>   |  27  |  1.01    Ca    8.2<L>      01 Aug 2018 07:47    TPro  5.7<L>  /  Alb  2.5<L>  /  TBili  0.7  /  DBili  x   /  AST  130<H>  /  ALT  82<H>  /  AlkPhos  117  08-01            RADIOLOGY & ADDITIONAL STUDIES:      Assessment & Opinion:  events noted  s/p neck aurg 3 wks ago post laminectomu  fever  nack pain  mri c spine noted not done with kike .  arm leg 4/5 sensory intact  toes down reflwxes 2  for mri c spine with kike  will folow     Recommendations: for mri c spine with kike  discuss with pt and family   Medications:
A/P: Pt is a 49 y/o male with a PMHx of IBS s/p C4-C7 laminectomy on 7/2 with recent fevers presenting for r/o SSI.  -Last fever 101.9 at 0215 on 8/1  -No leukocytosis (5.76)  -MRI C-Spine: No fluid collection. Normal post-operative changes.  -Exam and imaging not indicative of SSI. Unclear etiology for fever at this time. Negative meningeal signs.   -Currently on Zosyn, Doxycycline, Cefepime  -ID following (Dr. Salas)  -Ortho spine does not perform spinal taps. If clinically warranted, please consult IR or neurology.  -No orthopaedic intervention at this time. Please follow up with primary orthopaedic surgeon after discharge for further management.  -Ortho stable  -Will discuss with Dr. Hooker

## 2018-08-02 LAB
ALBUMIN SERPL ELPH-MCNC: 2.8 G/DL — LOW (ref 3.3–5)
ALP SERPL-CCNC: 121 U/L — HIGH (ref 40–120)
ALT FLD-CCNC: 154 U/L — HIGH (ref 12–78)
ANION GAP SERPL CALC-SCNC: 6 MMOL/L — SIGNIFICANT CHANGE UP (ref 5–17)
AST SERPL-CCNC: 220 U/L — HIGH (ref 15–37)
BILIRUB SERPL-MCNC: 0.8 MG/DL — SIGNIFICANT CHANGE UP (ref 0.2–1.2)
BUN SERPL-MCNC: 6 MG/DL — LOW (ref 7–23)
CALCIUM SERPL-MCNC: 8.4 MG/DL — LOW (ref 8.5–10.1)
CHLORIDE SERPL-SCNC: 108 MMOL/L — SIGNIFICANT CHANGE UP (ref 96–108)
CO2 SERPL-SCNC: 31 MMOL/L — SIGNIFICANT CHANGE UP (ref 22–31)
CREAT SERPL-MCNC: 1.01 MG/DL — SIGNIFICANT CHANGE UP (ref 0.5–1.3)
CULTURE RESULTS: SIGNIFICANT CHANGE UP
CULTURE RESULTS: SIGNIFICANT CHANGE UP
GLUCOSE SERPL-MCNC: 115 MG/DL — HIGH (ref 70–99)
HCT VFR BLD CALC: 33.4 % — LOW (ref 39–50)
HGB BLD-MCNC: 11.8 G/DL — LOW (ref 13–17)
MCHC RBC-ENTMCNC: 30.4 PG — SIGNIFICANT CHANGE UP (ref 27–34)
MCHC RBC-ENTMCNC: 35.3 GM/DL — SIGNIFICANT CHANGE UP (ref 32–36)
MCV RBC AUTO: 86.1 FL — SIGNIFICANT CHANGE UP (ref 80–100)
NRBC # BLD: 0 /100 WBCS — SIGNIFICANT CHANGE UP (ref 0–0)
PLATELET # BLD AUTO: 213 K/UL — SIGNIFICANT CHANGE UP (ref 150–400)
POTASSIUM SERPL-MCNC: 3.3 MMOL/L — LOW (ref 3.5–5.3)
POTASSIUM SERPL-SCNC: 3.3 MMOL/L — LOW (ref 3.5–5.3)
PROT SERPL-MCNC: 5.9 GM/DL — LOW (ref 6–8.3)
R RICKETTSI AB SER-ACNC: NEGATIVE — SIGNIFICANT CHANGE UP
R RICKETTSI IGM SER-ACNC: 0.13 INDEX — SIGNIFICANT CHANGE UP (ref 0–0.89)
RBC # BLD: 3.88 M/UL — LOW (ref 4.2–5.8)
RBC # FLD: 11.8 % — SIGNIFICANT CHANGE UP (ref 10.3–14.5)
RICK SF IGG TITR SER IF: NEGATIVE — SIGNIFICANT CHANGE UP
RICK SF IGM TITR SER IF: 0.13 INDEX — SIGNIFICANT CHANGE UP (ref 0–0.89)
SODIUM SERPL-SCNC: 145 MMOL/L — SIGNIFICANT CHANGE UP (ref 135–145)
SPECIMEN SOURCE: SIGNIFICANT CHANGE UP
SPECIMEN SOURCE: SIGNIFICANT CHANGE UP
VANCOMYCIN TROUGH SERPL-MCNC: 10.3 UG/ML — SIGNIFICANT CHANGE UP (ref 10–20)
WBC # BLD: 7.73 K/UL — SIGNIFICANT CHANGE UP (ref 3.8–10.5)
WBC # FLD AUTO: 7.73 K/UL — SIGNIFICANT CHANGE UP (ref 3.8–10.5)

## 2018-08-02 PROCEDURE — 72156 MRI NECK SPINE W/O & W/DYE: CPT | Mod: 26

## 2018-08-02 RX ORDER — ALPRAZOLAM 0.25 MG
1 TABLET ORAL ONCE
Qty: 0 | Refills: 0 | Status: DISCONTINUED | OUTPATIENT
Start: 2018-08-02 | End: 2018-08-02

## 2018-08-02 RX ORDER — POTASSIUM CHLORIDE 20 MEQ
40 PACKET (EA) ORAL ONCE
Qty: 0 | Refills: 0 | Status: COMPLETED | OUTPATIENT
Start: 2018-08-02 | End: 2018-08-02

## 2018-08-02 RX ORDER — NYSTATIN 500MM UNIT
500000 POWDER (EA) MISCELLANEOUS EVERY 4 HOURS
Qty: 0 | Refills: 0 | Status: DISCONTINUED | OUTPATIENT
Start: 2018-08-02 | End: 2018-08-03

## 2018-08-02 RX ORDER — IBUPROFEN 200 MG
400 TABLET ORAL EVERY 6 HOURS
Qty: 0 | Refills: 0 | Status: DISCONTINUED | OUTPATIENT
Start: 2018-08-02 | End: 2018-08-03

## 2018-08-02 RX ADMIN — CEFEPIME 100 MILLIGRAM(S): 1 INJECTION, POWDER, FOR SOLUTION INTRAMUSCULAR; INTRAVENOUS at 05:20

## 2018-08-02 RX ADMIN — CEFEPIME 100 MILLIGRAM(S): 1 INJECTION, POWDER, FOR SOLUTION INTRAMUSCULAR; INTRAVENOUS at 13:44

## 2018-08-02 RX ADMIN — Medication 166.67 MILLIGRAM(S): at 23:54

## 2018-08-02 RX ADMIN — Medication 400 MILLIGRAM(S): at 20:18

## 2018-08-02 RX ADMIN — Medication 110 MILLIGRAM(S): at 18:13

## 2018-08-02 RX ADMIN — SODIUM CHLORIDE 75 MILLILITER(S): 9 INJECTION INTRAMUSCULAR; INTRAVENOUS; SUBCUTANEOUS at 20:18

## 2018-08-02 RX ADMIN — Medication 1 MILLIGRAM(S): at 22:25

## 2018-08-02 RX ADMIN — HYDROMORPHONE HYDROCHLORIDE 2 MILLIGRAM(S): 2 INJECTION INTRAMUSCULAR; INTRAVENOUS; SUBCUTANEOUS at 13:10

## 2018-08-02 RX ADMIN — CEFEPIME 100 MILLIGRAM(S): 1 INJECTION, POWDER, FOR SOLUTION INTRAMUSCULAR; INTRAVENOUS at 22:26

## 2018-08-02 RX ADMIN — Medication 166.67 MILLIGRAM(S): at 10:08

## 2018-08-02 RX ADMIN — HYDROMORPHONE HYDROCHLORIDE 2 MILLIGRAM(S): 2 INJECTION INTRAMUSCULAR; INTRAVENOUS; SUBCUTANEOUS at 05:29

## 2018-08-02 RX ADMIN — HYDROMORPHONE HYDROCHLORIDE 2 MILLIGRAM(S): 2 INJECTION INTRAMUSCULAR; INTRAVENOUS; SUBCUTANEOUS at 00:57

## 2018-08-02 RX ADMIN — HYDROMORPHONE HYDROCHLORIDE 2 MILLIGRAM(S): 2 INJECTION INTRAMUSCULAR; INTRAVENOUS; SUBCUTANEOUS at 18:11

## 2018-08-02 RX ADMIN — HYDROMORPHONE HYDROCHLORIDE 2 MILLIGRAM(S): 2 INJECTION INTRAMUSCULAR; INTRAVENOUS; SUBCUTANEOUS at 19:03

## 2018-08-02 RX ADMIN — Medication 500000 UNIT(S): at 18:11

## 2018-08-02 RX ADMIN — Medication 1 TABLET(S): at 05:29

## 2018-08-02 RX ADMIN — NYSTATIN CREAM 1 APPLICATION(S): 100000 CREAM TOPICAL at 18:17

## 2018-08-02 RX ADMIN — Medication 650 MILLIGRAM(S): at 09:10

## 2018-08-02 RX ADMIN — Medication 400 MILLIGRAM(S): at 21:18

## 2018-08-02 RX ADMIN — SODIUM CHLORIDE 75 MILLILITER(S): 9 INJECTION INTRAMUSCULAR; INTRAVENOUS; SUBCUTANEOUS at 08:13

## 2018-08-02 RX ADMIN — Medication 500000 UNIT(S): at 15:11

## 2018-08-02 RX ADMIN — Medication 650 MILLIGRAM(S): at 08:13

## 2018-08-02 RX ADMIN — Medication 500000 UNIT(S): at 22:26

## 2018-08-02 RX ADMIN — HYDROMORPHONE HYDROCHLORIDE 2 MILLIGRAM(S): 2 INJECTION INTRAMUSCULAR; INTRAVENOUS; SUBCUTANEOUS at 12:10

## 2018-08-02 RX ADMIN — NYSTATIN CREAM 1 APPLICATION(S): 100000 CREAM TOPICAL at 05:30

## 2018-08-02 RX ADMIN — Medication 110 MILLIGRAM(S): at 05:20

## 2018-08-02 RX ADMIN — Medication 1 TABLET(S): at 18:11

## 2018-08-02 RX ADMIN — HYDROMORPHONE HYDROCHLORIDE 2 MILLIGRAM(S): 2 INJECTION INTRAMUSCULAR; INTRAVENOUS; SUBCUTANEOUS at 23:52

## 2018-08-02 NOTE — PROGRESS NOTE ADULT - ASSESSMENT
FUO , fever with headache and nausea r/o poss meningitis ?   Leukopenia resolved , FLu and HIV neg   Lyme neg   clinically no evidence of spine infection   MRI cervical spine with postop changes   Awaiting neuroSx evaluation to do lumbar puncture and their input as recommended by spine surgeon   on empiric coverage for meningitis , still with fever   CT chest / abd - reviewed - pyelo ? , his UA is neg for infection, no pyuria, no urinary symptoms   FU repeat blood cultures   vanco trough was subtherapeutic yesterday , will adjust the dose   we will fu  Case discussed with patient.
Subjective Complaints:  Historian:         REVIEW OF SYSTEMS:  Eyes:  Good vision, no reported pain  ENT:  No sore throat, pain, runny nose, dysphagia  CV:  No pain, palpitatioins, hypo/hypertension  Resp:  No dyspnea, cough, tachypnea, wheezing  GI:  No pain, nausea, vomiting, diarrhea, constipatiion  Muscle:  No pain, weakness  Neuro:  No weakness, tingling, memory problems  Psych:  No fatigue, insomnia, mood problems, depression  Endocrine:  No polyuria, polydypsia, cold/heat intolerance    Vital Signs Last 24 Hrs  T(C): 38 (02 Aug 2018 20:16), Max: 38 (02 Aug 2018 20:16)  T(F): 100.4 (02 Aug 2018 20:16), Max: 100.4 (02 Aug 2018 20:16)  HR: 97 (02 Aug 2018 17:21) (80 - 98)  BP: 124/72 (02 Aug 2018 17:21) (124/71 - 134/84)  BP(mean): --  RR: 17 (02 Aug 2018 17:21) (17 - 19)  SpO2: 97% (02 Aug 2018 17:21) (97% - 100%)    GENERAL PHYSICAL EXAM:  General:  Appears stated age, well-groomed, well-nourished, no distress  HEENT:  NC/AT, patent nares w/ pink mucosa, OP clear w/o lesions, PERRL, EOMI, conjunctivae clear, no thyromegaly, nodules, adenopathy, no JVD  Chest:  Full & symmetric excursion, no increased effort, breath sounds clear  Cardiovascular:  Regular rhythm, S1, S2, no murmur/rub/S3/S4, no carotid/femoral/abdominal bruit, radial/pedal pulses 2+, no edema  Abdomen:  Soft, non-tender, non-distended, normoactive bowel sounds, no HSM  Extremities:  Gait & station:   Digits:   Nails:   Joints, Bones, Muscles:   ROM:   Stability:  Skin:  No rash/erythema/ecchymoses/petechiae/wounds/abscess/warm/dry  Musculoskeletal:  Full ROM in all joints w/o swelling/tenderness/effusion    NEUROLOGICAL EXAM:  HENT:  Normocephalic head; atraumatic head.  Neck supple.  ENT: normal looking.  Mental State:    Alert.  Fully oriented to person, place and date.  Coherent.  Speech clear and intact.  Cooperative.  Responds appropriately.    Cranial Nerves:  II-XII:   Pupils round and reactive to light and accommodation.  Extraocular movements full.  Visual fields full (no homonymous hemianopsia).  Visual acuity wnl.  Facial symmetry intact.  Tongue midline.  Motor Functions:  Moves all extremities.  No pronator drift of UE.  Claps hand well.  Hand  intact bilaterally.  Ambulatory.    Sensory Functions:   Intact to touch and pinprick to face and extremities.    Reflexes:  Deep tendon reflexes normoactive to biceps, knees and ankles.  Babinski absent (present).  Cerebellar Testing:    Finger to nose intact.  Nystagmus absent.  Neurovascular: Carotid auscultation full without bruits.      LABS:                        11.8   7.73  )-----------( 213      ( 02 Aug 2018 07:14 )             33.4     08-02    145  |  108  |  6<L>  ----------------------------<  115<H>  3.3<L>   |  31  |  1.01    Ca    8.4<L>      02 Aug 2018 07:14    TPro  5.9<L>  /  Alb  2.8<L>  /  TBili  0.8  /  DBili  x   /  AST  220<H>  /  ALT  154<H>  /  AlkPhos  121<H>  08-02     ass= awaek alert speech  fluent  no fever mri c spine with kike noted no acute path no collection  arm leg 4/5  pt is  walking no [pain   will follow s/p neck surg 3 wks ago       RADIOLOGY & ADDITIONAL STUDIES:        Recommendations:  Brain MRI.   on iv anti biotics
FUO   clinically no evidence of spine infection   need spine sx eval to do lumbar puncture and their input as recommended by spine surgeon   covering for meningitis anyhow   will follow  discussed with dr phillips earlier

## 2018-08-02 NOTE — CHART NOTE - NSCHARTNOTEFT_GEN_A_CORE
Hospitalist Medicine PA    CC: Pre-SIBR rounds  Patient is a 48y old  Male who presents with a chief complaint of fever cellulitis (28 Jul 2018 19:16)    Pt seen and examined at bedside.    OVERNIGHT: Patient states his headache has resolved since last night and he is feeling much better. States he is having BMs with increased frequency, denies abdominal pain/n/v/d. States he is slightly concerned about being able to be discharged from the hospital by this weekend to pickup son at [place]. Patient refuses to take Potassium supplement as it causes it nausea and states he will eat some extra bananas today instead.    MEDICATIONS  (STANDING):  cefepime   IVPB 2000 milliGRAM(s) IV Intermittent every 8 hours  diphenoxylate/atropine 1 Tablet(s) Oral two times a day  doxycycline IVPB      doxycycline IVPB 100 milliGRAM(s) IV Intermittent every 12 hours  enoxaparin Injectable 40 milliGRAM(s) SubCutaneous daily  HYDROmorphone   Tablet 2 milliGRAM(s) Oral every 6 hours  nystatin    Suspension [id number] Unit(s) Oral every 4 hours  nystatin Cream 1 Application(s) Topical two times a day  sodium chloride 0.9%. 1000 milliLiter(s) (75 mL/Hr) IV Continuous <Continuous>  vancomycin  IVPB 1250 milliGRAM(s) IV Intermittent <User Schedule>    MEDICATIONS  (PRN):  diazepam    Tablet 5 milliGRAM(s) Oral two times a day PRN anxiety  diphenhydrAMINE   Capsule 25 milliGRAM(s) Oral every 6 hours PRN Rash and/or Itching  ibuprofen  Tablet 400 milliGRAM(s) Oral every 6 hours PRN mild pain/temp > 100.4  ondansetron Injectable 4 milliGRAM(s) IV Push every 8 hours PRN Nausea and/or Vomiting  oxyCODONE    5 mG/acetaminophen 325 mG 1 Tablet(s) Oral every 6 hours PRN Moderate Pain (4 - 6)  polyethylene glycol 3350 17 Gram(s) Oral daily PRN Constipation      Allergies    tetracycline (Hives)    Intolerances        VITALS:  T(C): 37.1 (08-02-18 @ 11:54), Max: 38.2 (08-01-18 @ 19:41)  HR: 98 (08-02-18 @ 11:54) (80 - 98)  BP: 134/84 (08-02-18 @ 11:54) (124/71 - 134/84)  RR: 18 (08-02-18 @ 11:54) (18 - 21)  SpO2: 97% (08-02-18 @ 11:54) (96% - 100%)  Wt(kg): --    PHYSICAL EXAM:  General: NAD, well-groomed, well-developed.  ENT: White film noted scattered on tongue. Normal oropharynx. Pink mucosa.  Eyes: Conjunctiva and sclera clear.  Lungs: Airway patent. CTA B/L. Normal breath sounds. No wheezes, rales, rhonchi.  Heart: RRR. Normal S1/S2. No murmurs appreciated.  Abdomen: Soft, NT/ND. Normoactive BS x 4 quadrants.  Neurological:  AAOx3. Good concentration. Strength 5/5 B/L upper and lower extremities.  Extremities:  2+ B/L peripheral pulses. No clubbing, cyanosis, or edema.    LABS:                        11.8   7.73  )-----------( 213      ( 02 Aug 2018 07:14 )             33.4     08-02    145  |  108  |  6<L>  ----------------------------<  115<H>  3.3<L>   |  31  |  1.01    Ca    8.4<L>      02 Aug 2018 07:14    TPro  5.9<L>  /  Alb  2.8<L>  /  TBili  0.8  /  DBili  x   /  AST  220<H>  /  ALT  154<H>  /  AlkPhos  121<H>  08-02          LIVER FUNCTIONS - ( 02 Aug 2018 07:14 )  Alb: 2.8 g/dL / Pro: 5.9 gm/dL / ALK PHOS: 121 U/L / ALT: 154 U/L / AST: 220 U/L / GGT: x             Culture - Blood (collected 29 Jul 2018 22:54)  Source: .Blood Blood-Venous  Preliminary Report (30 Jul 2018 23:02):    No growth to date.    Culture - Blood (collected 29 Jul 2018 22:54)  Source: .Blood Blood-Venous  Preliminary Report (30 Jul 2018 23:02):    No growth to date.    Culture - Blood (collected 28 Jul 2018 21:59)  Source: .Blood Blood  Preliminary Report (29 Jul 2018 22:01):    No growth to date.    Culture - Blood (collected 28 Jul 2018 21:59)  Source: .Blood Blood  Preliminary Report (29 Jul 2018 22:01):    No growth to date.    Culture - Urine (collected 28 Jul 2018 21:46)  Source: .Urine Clean Catch (Midstream)  Final Report (29 Jul 2018 22:09):    No growth      ASSESSMENT:  Patient is a 48y with SEPSIS DUE TO UNSPECIFIED ORGANISM;CELLULITIS OF NECK  FEVER of UNKOWN ORIGIN (now temps trending toward normal)  Anxiety  Irritable bowel syndrome  H/O laminectomy  - Increased frequency of BMs  - Elevated transaminases  - Hypokalemia      PLAN:  - d/c Colace and Senna  - Switch Tylenol to Ibuprofen for pain and fever  - Monitor LFTs, temps  - ? LP vs supportive care  - K supplemented via diet  - Continue current treatment  - D/w interdisciplinary team and patient - aware and agree with the plan  - Will continue to monitor

## 2018-08-03 VITALS — WEIGHT: 136.25 LBS

## 2018-08-03 LAB
ALBUMIN SERPL ELPH-MCNC: 2.4 G/DL — LOW (ref 3.3–5)
ALP SERPL-CCNC: 106 U/L — SIGNIFICANT CHANGE UP (ref 40–120)
ALT FLD-CCNC: 125 U/L — HIGH (ref 12–78)
ANION GAP SERPL CALC-SCNC: 8 MMOL/L — SIGNIFICANT CHANGE UP (ref 5–17)
AST SERPL-CCNC: 95 U/L — HIGH (ref 15–37)
BILIRUB SERPL-MCNC: 0.5 MG/DL — SIGNIFICANT CHANGE UP (ref 0.2–1.2)
BUN SERPL-MCNC: 6 MG/DL — LOW (ref 7–23)
CALCIUM SERPL-MCNC: 8.2 MG/DL — LOW (ref 8.5–10.1)
CHLORIDE SERPL-SCNC: 109 MMOL/L — HIGH (ref 96–108)
CO2 SERPL-SCNC: 29 MMOL/L — SIGNIFICANT CHANGE UP (ref 22–31)
CREAT SERPL-MCNC: 0.82 MG/DL — SIGNIFICANT CHANGE UP (ref 0.5–1.3)
CRP SERPL-MCNC: 3.96 MG/DL — HIGH (ref 0–0.4)
CULTURE RESULTS: SIGNIFICANT CHANGE UP
CULTURE RESULTS: SIGNIFICANT CHANGE UP
GLUCOSE SERPL-MCNC: 100 MG/DL — HIGH (ref 70–99)
HCT VFR BLD CALC: 32.4 % — LOW (ref 39–50)
HGB BLD-MCNC: 11.5 G/DL — LOW (ref 13–17)
MCHC RBC-ENTMCNC: 30.7 PG — SIGNIFICANT CHANGE UP (ref 27–34)
MCHC RBC-ENTMCNC: 35.5 GM/DL — SIGNIFICANT CHANGE UP (ref 32–36)
MCV RBC AUTO: 86.4 FL — SIGNIFICANT CHANGE UP (ref 80–100)
NRBC # BLD: 0 /100 WBCS — SIGNIFICANT CHANGE UP (ref 0–0)
PLATELET # BLD AUTO: 246 K/UL — SIGNIFICANT CHANGE UP (ref 150–400)
POTASSIUM SERPL-MCNC: 3.3 MMOL/L — LOW (ref 3.5–5.3)
POTASSIUM SERPL-SCNC: 3.3 MMOL/L — LOW (ref 3.5–5.3)
PROT SERPL-MCNC: 5.5 GM/DL — LOW (ref 6–8.3)
RBC # BLD: 3.75 M/UL — LOW (ref 4.2–5.8)
RBC # FLD: 11.8 % — SIGNIFICANT CHANGE UP (ref 10.3–14.5)
SODIUM SERPL-SCNC: 146 MMOL/L — HIGH (ref 135–145)
SPECIMEN SOURCE: SIGNIFICANT CHANGE UP
SPECIMEN SOURCE: SIGNIFICANT CHANGE UP
WBC # BLD: 6.89 K/UL — SIGNIFICANT CHANGE UP (ref 3.8–10.5)
WBC # FLD AUTO: 6.89 K/UL — SIGNIFICANT CHANGE UP (ref 3.8–10.5)

## 2018-08-03 RX ORDER — HYDROMORPHONE HYDROCHLORIDE 2 MG/ML
1 INJECTION INTRAMUSCULAR; INTRAVENOUS; SUBCUTANEOUS
Qty: 0 | Refills: 0 | COMMUNITY
Start: 2018-08-03

## 2018-08-03 RX ORDER — ELUXADOLINE 100 MG/1
0 TABLET, FILM COATED ORAL
Qty: 0 | Refills: 0 | COMMUNITY

## 2018-08-03 RX ORDER — ALPRAZOLAM 0.25 MG
0 TABLET ORAL
Qty: 0 | Refills: 0 | COMMUNITY

## 2018-08-03 RX ADMIN — Medication 500000 UNIT(S): at 06:10

## 2018-08-03 RX ADMIN — HYDROMORPHONE HYDROCHLORIDE 2 MILLIGRAM(S): 2 INJECTION INTRAMUSCULAR; INTRAVENOUS; SUBCUTANEOUS at 00:52

## 2018-08-03 RX ADMIN — Medication 110 MILLIGRAM(S): at 06:10

## 2018-08-03 RX ADMIN — HYDROMORPHONE HYDROCHLORIDE 2 MILLIGRAM(S): 2 INJECTION INTRAMUSCULAR; INTRAVENOUS; SUBCUTANEOUS at 06:12

## 2018-08-03 RX ADMIN — CEFEPIME 100 MILLIGRAM(S): 1 INJECTION, POWDER, FOR SOLUTION INTRAMUSCULAR; INTRAVENOUS at 06:10

## 2018-08-03 RX ADMIN — Medication 1 TABLET(S): at 06:10

## 2018-08-03 RX ADMIN — NYSTATIN CREAM 1 APPLICATION(S): 100000 CREAM TOPICAL at 06:10

## 2018-08-03 RX ADMIN — Medication 500000 UNIT(S): at 01:23

## 2018-08-03 NOTE — PROGRESS NOTE ADULT - PROVIDER SPECIALTY LIST ADULT
Infectious Disease
Internal Medicine
Neurology
Infectious Disease

## 2018-08-03 NOTE — DISCHARGE NOTE ADULT - MEDICATION SUMMARY - MEDICATIONS TO TAKE
I will START or STAY ON the medications listed below when I get home from the hospital:    HYDROmorphone 2 mg oral tablet  -- 1 tab(s) by mouth every 6 hours  -- Indication: For H/O laminectomy    Valium  -- Indication: For Anxiety    Lomotil  -- Indication: For Irritable  bowel    doxycycline hyclate 100 mg oral capsule  -- 1 cap(s) by mouth 2 times a day   -- Avoid prolonged or excessive exposure to direct and/or artificial sunlight while taking this medication.  Do not take this drug if you are pregnant.  Finish all this medication unless otherwise directed by prescriber.  Medication should be taken with plenty of water.    -- Indication: For Cellulitis of neck I will START or STAY ON the medications listed below when I get home from the hospital:    HYDROmorphone 2 mg oral tablet  -- 1 tab(s) by mouth every 6 hours  -- Indication: For Pain    Valium  -- Indication: For Muscle spasm    Lomotil  -- Indication: For IBS    doxycycline hyclate 100 mg oral capsule  -- 1 cap(s) by mouth 2 times a day   -- Avoid prolonged or excessive exposure to direct and/or artificial sunlight while taking this medication.  Do not take this drug if you are pregnant.  Finish all this medication unless otherwise directed by prescriber.  Medication should be taken with plenty of water.    -- Indication: For Cellulitis of neck

## 2018-08-03 NOTE — PROGRESS NOTE ADULT - SUBJECTIVE AND OBJECTIVE BOX
presents  with  fever  for  3 days  had  cervical  spine laminectomy  3  weeks  ago evaluated  in  urgent care center  advised to  go  to  [facility]  evaluated  in  [facility]  found  to  have  sepsis  admitted  for  further  w/u  and  treatment (28 Jul 2018 19:16)  Upon evaluation today, pt still with fever, headache and some nausea. Admit he has been using dilaudid for many years since dx with back problems and surgery. Asking for more dilaudid. Report he came in with fever and headache started here.       Allergies    tetracycline (Hives)    Intolerances        MEDICATIONS  (STANDING):  cefepime   IVPB      cefepime   IVPB 2000 milliGRAM(s) IV Intermittent every 8 hours  docusate sodium 100 milliGRAM(s) Oral two times a day  doxycycline IVPB      doxycycline IVPB 100 milliGRAM(s) IV Intermittent every 12 hours  enoxaparin Injectable 40 milliGRAM(s) SubCutaneous daily  HYDROmorphone   Tablet 2 milliGRAM(s) Oral every 6 hours  nystatin Cream 1 Application(s) Topical two times a day  senna 2 Tablet(s) Oral at bedtime  sodium chloride 0.9%. 1000 milliLiter(s) (75 mL/Hr) IV Continuous <Continuous>  vancomycin  IVPB 1000 milliGRAM(s) IV Intermittent every 12 hours      REVIEW OF SYSTEMS:    CONSTITUTIONAL: + fever +   HEENT: No sore throat, runny nose, ear ache  RESPIRATORY: No cough, wheezing, No shortness of breath  CARDIOVASCULAR: No chest pain, palpitations, dizziness  GASTROINTESTINAL: No abdominal pain. No nausea, vomiting, diarrhea  GENITOURINARY: No dysuria, increase frequency, hematuria, or incontinence  NEUROLOGICAL: No memory loss, loss of strength, numbness, or tremors, no weakness. headache + , neck pain + due to recent surgery , no neck stiffness   EXTREMITY: No pedal edema BLE  SKIN: No itching, burning, rashes, or lesions     VITAL SIGNS:  T(C): 37.8 (08-01-18 @ 05:30), Max: 38.9 (07-31-18 @ 14:15)  T(F): 100 (08-01-18 @ 05:30), Max: 102 (07-31-18 @ 14:15)  HR: 95 (08-01-18 @ 05:30) (95 - 99)  BP: 112/75 (08-01-18 @ 05:30) (109/76 - 129/75)  RR: 18 (08-01-18 @ 05:30) (16 - 18)  SpO2: 96% (08-01-18 @ 05:30) (96% - 99%)  Wt(kg): --    PHYSICAL EXAM:    GENERAL: not in any distress  HEENT: Neck is supple, normocephalic, atraumatic . minimal tender upon movement   CHEST/LUNG: Clear to percussion bilaterally; No rales, rhonchi, wheezing  HEART: Regular rate and rhythm; No murmurs, rubs, or gallops  ABDOMEN: Soft, Nontender, Nondistended; Bowel sounds present, no rebound   EXTREMITIES:  2+ Peripheral Pulses, No clubbing, cyanosis, or edema  GENITOURINARY: benign   SKIN: No rashes or lesions  BACK: no pressor sore   NERVOUS SYSTEM:  Alert & Oriented X3    LABS:                         11.7   5.76  )-----------( 173      ( 01 Aug 2018 07:47 )             33.0     08-01    143  |  106  |  6<L>  ----------------------------<  158<H>  3.4<L>   |  27  |  1.01    Ca    8.2<L>      01 Aug 2018 07:47    TPro  5.7<L>  /  Alb  2.5<L>  /  TBili  0.7  /  DBili  x   /  AST  130<H>  /  ALT  82<H>  /  AlkPhos  117  08-01    LIVER FUNCTIONS - ( 01 Aug 2018 07:47 )  Alb: 2.5 g/dL / Pro: 5.7 gm/dL / ALK PHOS: 117 U/L / ALT: 82 U/L / AST: 130 U/L / GGT: x                         Sedimentation Rate, Erythrocyte: 7 mm/hr (07-28 @ 21:27)  Sedimentation Rate, Erythrocyte: 10 mm/hr (07-28 @ 15:53)      Vancomycin Level, Trough: 6.8 ug/mL (07-30 @ 21:25)        Culture Results:   No growth to date. (07-29 @ 22:54)  Culture Results:   No growth to date. (07-29 @ 22:54)  Culture Results:   No growth to date. (07-28 @ 21:59)  Culture Results:   No growth to date. (07-28 @ 21:59)  Culture Results:   No growth (07-28 @ 21:46)                Radiology:
INTERVAL HPI/OVERNIGHT EVENTS:        REVIEW OF SYSTEMS:  CONSTITUTIONAL:  C/O  itching  at times  NECK: No pain or stiffnes  RESPIRATORY: No SOB   CARDIOVASCULAR: No chest pain, palpitations, dizziness,   GASTROINTESTINAL: No abdominal pain. No nausea, vomiting,   NEUROLOGICAL: No headaches, no  blurry  vision no  dizziness  SKIN: No itching,   MUSCULOSKELETAL:   neck  pain diffuse  body  aches    MEDICATION:  acetaminophen   Tablet 650 milliGRAM(s) Oral every 6 hours PRN  acetaminophen   Tablet. 650 milliGRAM(s) Oral every 6 hours PRN  cefepime   IVPB      cefepime   IVPB 2000 milliGRAM(s) IV Intermittent every 8 hours  diazepam    Tablet 5 milliGRAM(s) Oral two times a day PRN  diphenoxylate/atropine 1 Tablet(s) Oral two times a day  enoxaparin Injectable 40 milliGRAM(s) SubCutaneous daily  HYDROmorphone  Injectable 2 milliGRAM(s) IV Push every 6 hours PRN  oxyCODONE    5 mG/acetaminophen 325 mG 1 Tablet(s) Oral every 6 hours PRN  potassium chloride  10 mEq/100 mL IVPB 10 milliEquivalent(s) IV Intermittent once  sodium chloride 0.9%. 1000 milliLiter(s) IV Continuous <Continuous>  vancomycin  IVPB 1000 milliGRAM(s) IV Intermittent every 12 hours    Vital Signs Last 24 Hrs  T(C): 38.8 (2018 09:04), Max: 40.1 (2018 17:00)  T(F): 101.8 (2018 09:04), Max: 104.1 (2018 17:00)  HR: 107 (2018 09:04) (87 - 112)  BP: 108/73 (2018 09:04) (108/73 - 146/86)  BP(mean): --  RR: 16 (2018 05:40) (15 - 16)  SpO2: 98% (2018 05:40) (96% - 98%)    PHYSICAL EXAM:  GENERAL: NAD, well-groomed, well-developed  EYES:  conjunctiva and sclera clear  ENMT:  Moist mucous membranes,   NECK: Supple, No JVD, Normal thyroid  NERVOUS SYSTEM:  Alert oriented   no  focal  deficits;   CHEST/LUNG: Clear    HEART: Regular rate and rhythm; No murmurs, rubs, or gallops  ABDOMEN: Soft, Nontender, Nondistended; Bowel sounds present  EXTREMITIES:  no  edema no  tenderness  SKIN: No rashes   LABS:                        12.3   3.56  )-----------( 122      ( 2018 07:34 )             33.9     07-    140  |  106  |  10  ----------------------------<  151<H>  3.2<L>   |  24  |  1.18    Ca    7.7<L>      2018 07:34  Mg     1.9         TPro  5.4<L>  /  Alb  2.8<L>  /  TBili  0.7  /  DBili  x   /  AST  41<H>  /  ALT  31  /  AlkPhos  86      PT/INR - ( 2018 15:53 )   PT: 11.9 sec;   INR: 1.09 ratio         PTT - ( 2018 15:53 )  PTT:30.8 sec  Urinalysis Basic - ( 2018 17:18 )    Color: Yellow / Appearance: Clear / S.020 / pH: x  Gluc: x / Ketone: Trace  / Bili: Negative / Urobili: Negative mg/dL   Blood: x / Protein: 15 mg/dL / Nitrite: Negative   Leuk Esterase: Negative / RBC: 0-2 /HPF / WBC 0-2   Sq Epi: x / Non Sq Epi: Occasional / Bacteria: Moderate      CAPILLARY BLOOD GLUCOSE          RADIOLOGY & ADDITIONAL TESTS:    Imaging reports  Personally Reviewed:  [ x] YES  [ ] NO    Consultant(s) Notes Reviewed:  [x ] YES  [ ] NO    Care Discussed with Consultants/Other Providers [ x] YES  [ ] NO  Assessment and Plan:   Problem/Plan - 1:  ·  Problem: Sepsis, due to unspecified organism.  Plan: IVF  fortaz  Vancomycin  tylenol  as  per  ID    Problem/Plan - 2:  ·  Problem: Cellulitis of neck.  Plan: as  above.     Problem/Plan - 3:  ·  Problem: Anxiety.  Plan: Valium prn.     Problem/Plan - 4:  ·  Problem: Irritable bowel syndrome.  Plan: lomotil. Pt  has  refused because  feels  well will d/c  Pruritus  banadryl  hypokalemia  benadryl
INTERVAL HPI/OVERNIGHT EVENTS:        REVIEW OF SYSTEMS:  CONSTITUTIONAL:  c/o  fatigue  diffuse  pain    NECK: No pain or stiffnes  RESPIRATORY: No SOB   CARDIOVASCULAR: No chest pain, palpitations, dizziness,   GASTROINTESTINAL: No abdominal pain. No nausea, vomiting,   NEUROLOGICAL: No headaches, no  blurry  vision no  dizziness  SKIN: No itching,   MUSCULOSKELETAL: No pain    MEDICATION:  acetaminophen   Tablet 650 milliGRAM(s) Oral every 6 hours PRN  acetaminophen   Tablet. 650 milliGRAM(s) Oral every 6 hours PRN  diazepam    Tablet 5 milliGRAM(s) Oral two times a day PRN  diphenoxylate/atropine 1 Tablet(s) Oral two times a day  enoxaparin Injectable 40 milliGRAM(s) SubCutaneous daily  HYDROmorphone  Injectable 2 milliGRAM(s) IV Push every 6 hours PRN  oxyCODONE    5 mG/acetaminophen 325 mG 1 Tablet(s) Oral every 6 hours PRN  piperacillin/tazobactam IVPB. 3.375 Gram(s) IV Intermittent every 8 hours  sodium chloride 0.9%. 1000 milliLiter(s) IV Continuous <Continuous>  vancomycin  IVPB 1000 milliGRAM(s) IV Intermittent every 12 hours    Vital Signs Last 24 Hrs  T(C): 38.5 (2018 05:44), Max: 40 (2018 15:32)  T(F): 101.3 (2018 05:44), Max: 104 (2018 15:32)  HR: 89 (2018 05:44) (69 - 130)  BP: 127/74 (2018 05:44) (109/70 - 136/94)  BP(mean): --  RR: 17 (2018 05:44) (16 - 20)  SpO2: 97% (2018 05:44) (94% - 100%)    PHYSICAL EXAM:  GENERAL: NAD, well-groomed, well-developed  EYES:  conjunctiva and sclera clear  ENMT:  Moist mucous membranes,   NECK: Supple, No JVD, Normal thyroid  NERVOUS SYSTEM:  Alert oriented   no  focal  deficits;   CHEST/LUNG: Clear    HEART: Regular rate and rhythm; No murmurs, rubs, or gallops  ABDOMEN: Soft, Nontender, Nondistended; Bowel sounds present  EXTREMITIES:  no  edema no  tenderness  SKIN: No rashes   LABS:                        13.0   4.06  )-----------( 156      ( 2018 07:19 )             35.4         141  |  108  |  10  ----------------------------<  137<H>  3.5   |  24  |  0.94    Ca    7.6<L>      2018 07:19  Mg     1.9         TPro  5.4<L>  /  Alb  2.8<L>  /  TBili  0.7  /  DBili  x   /  AST  41<H>  /  ALT  31  /  AlkPhos  86      PT/INR - ( 2018 15:53 )   PT: 11.9 sec;   INR: 1.09 ratio         PTT - ( 2018 15:53 )  PTT:30.8 sec  Urinalysis Basic - ( 2018 17:18 )    Color: Yellow / Appearance: Clear / S.020 / pH: x  Gluc: x / Ketone: Trace  / Bili: Negative / Urobili: Negative mg/dL   Blood: x / Protein: 15 mg/dL / Nitrite: Negative   Leuk Esterase: Negative / RBC: 0-2 /HPF / WBC 0-2   Sq Epi: x / Non Sq Epi: Occasional / Bacteria: Moderate      CAPILLARY BLOOD GLUCOSE          RADIOLOGY & ADDITIONAL TESTS:    Imaging reports  Personally Reviewed:  [x ] YES  [ ] NO    Consultant(s) Notes Reviewed:  [x ] YES  [ ] NO    Care Discussed with Consultants/Other Providers [x ] YES  [ ] NO  Assessment and Plan:   Problem/Plan - 1:  ·  Problem: Sepsis, due to unspecified organism.  Plan: IVFb  Zosyn  Vancomycin  tylenol  ID  eval.     Problem/Plan - 2:  ·  Problem: Cellulitis of neck.  Plan: as  above.     Problem/Plan - 3:  ·  Problem: Anxiety.  Plan: Valium prn.     Problem/Plan - 4:  ·  Problem: Irritable bowel syndrome.  Plan: lomotil.
INTERVAL HPI/OVERNIGHT EVENTS:        REVIEW OF SYSTEMS:  CONSTITUTIONAL:  c/o back  pain episodic  HA  no  nausea  no  vomiting    NECK: No pain or stiffnes  RESPIRATORY: No SOB   CARDIOVASCULAR: No chest pain, palpitations, dizziness,   GASTROINTESTINAL: No abdominal pain. No nausea, vomiting,   NEUROLOGICAL: No headaches, no  blurry  vision no  dizziness  SKIN: No itching,   MUSCULOSKELETAL: No pain    MEDICATION:  acetaminophen   Tablet 650 milliGRAM(s) Oral every 6 hours PRN  acetaminophen   Tablet. 650 milliGRAM(s) Oral every 6 hours PRN  cefepime   IVPB      cefepime   IVPB 2000 milliGRAM(s) IV Intermittent every 8 hours  diazepam    Tablet 5 milliGRAM(s) Oral two times a day PRN  diphenhydrAMINE   Capsule 25 milliGRAM(s) Oral every 6 hours PRN  docusate sodium 100 milliGRAM(s) Oral two times a day  doxycycline IVPB      doxycycline IVPB 100 milliGRAM(s) IV Intermittent every 12 hours  enoxaparin Injectable 40 milliGRAM(s) SubCutaneous daily  HYDROmorphone   Tablet 2 milliGRAM(s) Oral every 6 hours  nystatin Cream 1 Application(s) Topical two times a day  ondansetron Injectable 4 milliGRAM(s) IV Push every 8 hours PRN  oxyCODONE    5 mG/acetaminophen 325 mG 1 Tablet(s) Oral every 6 hours PRN  polyethylene glycol 3350 17 Gram(s) Oral daily PRN  senna 2 Tablet(s) Oral at bedtime  sodium chloride 0.9%. 1000 milliLiter(s) IV Continuous <Continuous>  vancomycin  IVPB 1000 milliGRAM(s) IV Intermittent every 12 hours    Vital Signs Last 24 Hrs  T(C): 37.8 (01 Aug 2018 05:30), Max: 38.9 (31 Jul 2018 14:15)  T(F): 100 (01 Aug 2018 05:30), Max: 102 (31 Jul 2018 14:15)  HR: 95 (01 Aug 2018 05:30) (95 - 99)  BP: 112/75 (01 Aug 2018 05:30) (109/76 - 129/75)  BP(mean): --  RR: 18 (01 Aug 2018 05:30) (16 - 18)  SpO2: 96% (01 Aug 2018 05:30) (96% - 99%)    PHYSICAL EXAM:  GENERAL: NAD, well-groomed, well-developed  EYES:  conjunctiva and sclera clear  ENMT:  Moist mucous membranes,   NECK: Supple, No JVD, Normal thyroid  NERVOUS SYSTEM:  Alert oriented   no  focal  deficits;   CHEST/LUNG: Clear    HEART: Regular rate and rhythm; No murmurs, rubs, or gallops  ABDOMEN: Soft, Nontender, Nondistended; Bowel sounds present  EXTREMITIES:  no  edema no  tenderness  SKIN: No rashes   LABS:                        11.7   5.76  )-----------( 173      ( 01 Aug 2018 07:47 )             33.0     08-01    143  |  106  |  6<L>  ----------------------------<  158<H>  3.4<L>   |  27  |  1.01    Ca    8.2<L>      01 Aug 2018 07:47    TPro  5.7<L>  /  Alb  2.5<L>  /  TBili  0.7  /  DBili  x   /  AST  130<H>  /  ALT  82<H>  /  AlkPhos  117  08-01        CAPILLARY BLOOD GLUCOSE          RADIOLOGY & ADDITIONAL TESTS:    Imaging reports  Personally Reviewed:  [ x] YES  [ ] NO    Consultant(s) Notes Reviewed:  [ x] YES  [ ] NO    Care Discussed with Consultants/Other Providers [x ] YES  [ ] NO  Assessment and Plan:   Problem/Plan - 1:  ·  Problem: Sepsis, due to unspecified organism.  Plan: IVF  fortaz  Vancomycin  doxycycline tylenol  as  per  ID  for  neuro surgery  evalaution    Problem/Plan - 2:  ·  Problem: Cellulitis of neck.  Plan: as  above.     Problem/Plan - 3:  ·  Problem: Anxiety.  Plan: Valium prn.     Problem/Plan - 4:  ·  Problem: Irritable bowel syndrome.  Plan: lomotil. Pt  has  refused because  feels  well will d/c  Pruritus  banadryl  back  pain  HA  hydromorphone percocet  hypokalemia supplement
INTERVAL HPI/OVERNIGHT EVENTS:        REVIEW OF SYSTEMS:  CONSTITUTIONAL:  feels  well had  episode of  emesis  yesterday      NECK: No pain or stiffnes  RESPIRATORY: No SOB   CARDIOVASCULAR: No chest pain, palpitations, dizziness,   GASTROINTESTINAL: No abdominal pain. No nausea, vomiting,   NEUROLOGICAL: No headaches, no  blurry  vision no  dizziness  SKIN: No itching,   MUSCULOSKELETAL: No pain    MEDICATION:  acetaminophen   Tablet 650 milliGRAM(s) Oral every 6 hours  acetaminophen   Tablet. 650 milliGRAM(s) Oral every 6 hours PRN  cefepime   IVPB      cefepime   IVPB 2000 milliGRAM(s) IV Intermittent every 8 hours  diazepam    Tablet 5 milliGRAM(s) Oral two times a day PRN  diphenhydrAMINE   Capsule 25 milliGRAM(s) Oral every 6 hours PRN  docusate sodium 100 milliGRAM(s) Oral two times a day  doxycycline IVPB      doxycycline IVPB 100 milliGRAM(s) IV Intermittent every 12 hours  enoxaparin Injectable 40 milliGRAM(s) SubCutaneous daily  HYDROmorphone  Injectable 2 milliGRAM(s) IV Push every 6 hours PRN  nystatin Cream 1 Application(s) Topical two times a day  ondansetron Injectable 4 milliGRAM(s) IV Push every 8 hours PRN  polyethylene glycol 3350 17 Gram(s) Oral daily PRN  senna 2 Tablet(s) Oral at bedtime  sodium chloride 0.9%. 1000 milliLiter(s) IV Continuous <Continuous>  vancomycin  IVPB 1000 milliGRAM(s) IV Intermittent every 12 hours    Vital Signs Last 24 Hrs  T(C): 37.7 (31 Jul 2018 07:08), Max: 38.9 (30 Jul 2018 11:44)  T(F): 99.8 (31 Jul 2018 07:08), Max: 102 (30 Jul 2018 11:44)  HR: 98 (31 Jul 2018 05:51) (77 - 109)  BP: 116/82 (31 Jul 2018 05:51) (99/63 - 131/87)  BP(mean): --  RR: 17 (31 Jul 2018 05:51) (17 - 18)  SpO2: 100% (31 Jul 2018 05:51) (94% - 100%)    PHYSICAL EXAM:  GENERAL: NAD, well-groomed, well-developed  EYES:  conjunctiva and sclera clear  ENMT:  Moist mucous membranes,   NECK: Supple, No JVD, Normal thyroid  NERVOUS SYSTEM:  Alert oriented   no  focal  deficits;   CHEST/LUNG: Clear    HEART: Regular rate and rhythm; No murmurs, rubs, or gallops  ABDOMEN: Soft, Nontender, Nondistended; Bowel sounds present  EXTREMITIES:  no  edema no  tenderness  SKIN: No rashes   LABS:                        12.1   3.60  )-----------( 139      ( 31 Jul 2018 07:33 )             33.3     07-31    140  |  106  |  7   ----------------------------<  154<H>  3.5   |  26  |  1.04    Ca    7.9<L>      31 Jul 2018 07:33    TPro  5.4<L>  /  Alb  2.6<L>  /  TBili  0.8  /  DBili  x   /  AST  59<H>  /  ALT  39  /  AlkPhos  90  07-31        CAPILLARY BLOOD GLUCOSE          RADIOLOGY & ADDITIONAL TESTS:    Imaging reports  Personally Reviewed:  [x ] YES  [ ] NO    Consultant(s) Notes Reviewed:  [x ] YES  [ ] NO    Care Discussed with Consultants/Other Providers [x ] YES  [ ] NO  Assessment and Plan:   Problem/Plan - 1:  ·  Problem: Sepsis, due to unspecified organism.  Plan: IVF  fortaz  Vancomycin  doxycycline tylenol  as  per  ID    Problem/Plan - 2:  ·  Problem: Cellulitis of neck.  Plan: as  above.     Problem/Plan - 3:  ·  Problem: Anxiety.  Plan: Valium prn.     Problem/Plan - 4:  ·  Problem: Irritable bowel syndrome.  Plan: lomotil. Pt  has  refused because  feels  well will d/c  Pruritus  banadryl  hypokalemia supplement
INTERVAL HPI/OVERNIGHT EVENTS:        REVIEW OF SYSTEMS:  CONSTITUTIONAL: feels  great no  complaints    NECK: No pain or stiffnes  RESPIRATORY: No SOB   CARDIOVASCULAR: No chest pain, palpitations, dizziness,   GASTROINTESTINAL: No abdominal pain. No nausea, vomiting,   NEUROLOGICAL: No headaches, no  blurry  vision no  dizziness  SKIN: No itching,   MUSCULOSKELETAL: No pain    MEDICATION:  cefepime   IVPB 2000 milliGRAM(s) IV Intermittent every 8 hours  diazepam    Tablet 5 milliGRAM(s) Oral two times a day PRN  diphenhydrAMINE   Capsule 25 milliGRAM(s) Oral every 6 hours PRN  diphenoxylate/atropine 1 Tablet(s) Oral two times a day  doxycycline IVPB      doxycycline IVPB 100 milliGRAM(s) IV Intermittent every 12 hours  enoxaparin Injectable 40 milliGRAM(s) SubCutaneous daily  HYDROmorphone   Tablet 2 milliGRAM(s) Oral every 6 hours  ibuprofen  Tablet 400 milliGRAM(s) Oral every 6 hours PRN  nystatin    Suspension 651045 Unit(s) Oral every 4 hours  nystatin Cream 1 Application(s) Topical two times a day  ondansetron Injectable 4 milliGRAM(s) IV Push every 8 hours PRN  oxyCODONE    5 mG/acetaminophen 325 mG 1 Tablet(s) Oral every 6 hours PRN  polyethylene glycol 3350 17 Gram(s) Oral daily PRN  sodium chloride 0.9%. 1000 milliLiter(s) IV Continuous <Continuous>  vancomycin  IVPB 1250 milliGRAM(s) IV Intermittent <User Schedule>    Vital Signs Last 24 Hrs  T(C): 37.1 (02 Aug 2018 11:54), Max: 38.2 (01 Aug 2018 19:41)  T(F): 98.8 (02 Aug 2018 11:54), Max: 100.7 (01 Aug 2018 19:41)  HR: 98 (02 Aug 2018 11:54) (80 - 98)  BP: 134/84 (02 Aug 2018 11:54) (124/71 - 134/84)  BP(mean): --  RR: 18 (02 Aug 2018 11:54) (18 - 21)  SpO2: 97% (02 Aug 2018 11:54) (96% - 100%)    PHYSICAL EXAM:  GENERAL: NAD, well-groomed, well-developed  EYES:  conjunctiva and sclera clear  ENMT:  Moist mucous membranes,   NECK: Supple, No JVD, Normal thyroid  NERVOUS SYSTEM:  Alert oriented   no  focal  deficits;   CHEST/LUNG: Clear    HEART: Regular rate and rhythm; No murmurs, rubs, or gallops  ABDOMEN: Soft, Nontender, Nondistended; Bowel sounds present  EXTREMITIES:  no  edema no  tenderness  SKIN: No rashes   LABS:                        11.8   7.73  )-----------( 213      ( 02 Aug 2018 07:14 )             33.4     08-02    145  |  108  |  6<L>  ----------------------------<  115<H>  3.3<L>   |  31  |  1.01    Ca    8.4<L>      02 Aug 2018 07:14    TPro  5.9<L>  /  Alb  2.8<L>  /  TBili  0.8  /  DBili  x   /  AST  220<H>  /  ALT  154<H>  /  AlkPhos  121<H>  08-02        CAPILLARY BLOOD GLUCOSE          RADIOLOGY & ADDITIONAL TESTS:    Imaging reports  Personally Reviewed:  [x ] YES  [ ] NO    Consultant(s) Notes Reviewed:  [x ] YES  [ ] NO    Care Discussed with Consultants/Other Providers [ x] YES  [ ] NO  Assessment and Plan:   Problem/Plan - 1:  ·  Problem: Sepsis, due to unspecified organism.  Plan: IVF  fortaz  Vancomycin  doxycycline tylenol  as  per  ID  for  neuro surgery  evalaution    Problem/Plan - 2:  ·  Problem: Cellulitis of neck.  Plan: as  above.     Problem/Plan - 3:  ·  Problem: Anxiety.  Plan: Valium prn.     Problem/Plan - 4:  ·  Problem: Irritable bowel syndrome.  Plan: lomotil. Pt  has  refused because  feels  well will d/c  Pruritus  banadryl  back  pain  HA  hydromorphone percocet  hypokalemia   refuses  IV  supplement   cannot  take  oral  supplements  secondary  to  Benadryl  and  lomotil  states  will  eat  more  fruits  such  as  bananas  discharge  in  AM  if  continues  Afebrile
INTERVAL HPI/OVERNIGHT EVENTS:        REVIEW OF SYSTEMS:  CONSTITUTIONAL: feels  well presents  no  complaints    NECK: No pain or stiffnes  RESPIRATORY: No SOB   CARDIOVASCULAR: No chest pain, palpitations, dizziness,   GASTROINTESTINAL: No abdominal pain. No nausea, vomiting,   NEUROLOGICAL: No headaches, no  blurry  vision no  dizziness  SKIN: No itching,   MUSCULOSKELETAL: No pain    MEDICATION:  cefepime   IVPB 2000 milliGRAM(s) IV Intermittent every 8 hours  diazepam    Tablet 5 milliGRAM(s) Oral two times a day PRN  diphenhydrAMINE   Capsule 25 milliGRAM(s) Oral every 6 hours PRN  diphenoxylate/atropine 1 Tablet(s) Oral two times a day  doxycycline IVPB      doxycycline IVPB 100 milliGRAM(s) IV Intermittent every 12 hours  enoxaparin Injectable 40 milliGRAM(s) SubCutaneous daily  HYDROmorphone   Tablet 2 milliGRAM(s) Oral every 6 hours  ibuprofen  Tablet 400 milliGRAM(s) Oral every 6 hours PRN  nystatin    Suspension [id number] Unit(s) Oral every 4 hours  nystatin Cream 1 Application(s) Topical two times a day  ondansetron Injectable 4 milliGRAM(s) IV Push every 8 hours PRN  oxyCODONE    5 mG/acetaminophen 325 mG 1 Tablet(s) Oral every 6 hours PRN  polyethylene glycol 3350 17 Gram(s) Oral daily PRN  vancomycin  IVPB 1250 milliGRAM(s) IV Intermittent <User Schedule>    Vital Signs Last 24 Hrs  T(C): 36.2 (03 Aug 2018 05:24), Max: 38 (02 Aug 2018 20:16)  T(F): 97.2 (03 Aug 2018 05:24), Max: 100.4 (02 Aug 2018 20:16)  HR: 76 (03 Aug 2018 05:24) (76 - 98)  BP: 123/71 (03 Aug 2018 05:24) (123/71 - 134/84)  BP(mean): --  RR: 17 (03 Aug 2018 05:24) (16 - 18)  SpO2: 95% (03 Aug 2018 05:24) (95% - 98%)    PHYSICAL EXAM:  GENERAL: NAD, well-groomed, well-developed  EYES:  conjunctiva and sclera clear  ENMT:  Moist mucous membranes,   NECK: Supple, No JVD, Normal thyroid  NERVOUS SYSTEM:  Alert oriented   no  focal  deficits;   CHEST/LUNG: Clear    HEART: Regular rate and rhythm; No murmurs, rubs, or gallops  ABDOMEN: Soft, Nontender, Nondistended; Bowel sounds present  EXTREMITIES:  no  edema no  tenderness  SKIN: No rashes   LABS:                        11.5   6.89  )-----------( 246      ( 03 Aug 2018 06:41 )             32.4     08-03    146<H>  |  109<H>  |  6<L>  ----------------------------<  100<H>  3.3<L>   |  29  |  0.82    Ca    8.2<L>      03 Aug 2018 06:41    TPro  5.5<L>  /  Alb  2.4<L>  /  TBili  0.5  /  DBili  x   /  AST  95<H>  /  ALT  125<H>  /  AlkPhos  106  08-03        CAPILLARY BLOOD GLUCOSE          RADIOLOGY & ADDITIONAL TESTS:    Imaging reports  Personally Reviewed:  [x ] YES  [ ] NO    Consultant(s) Notes Reviewed:  [x ] YES  [ ] NO    Care Discussed with Consultants/Other Providers [x ] YES  [ ] NO  Assessment and Plan:   Problem/Plan - 1:  ·  Problem: fever  cellulitis due to unspecified organism.  Plan:   discharge  home continue  with  doxycycline     Problem/Plan - 2:  ·  Problem: Cellulitis of neck.  Plan: as  above.     Problem/Plan - 3:  ·  Problem: Anxiety.  Plan: Valium prn.     Problem/Plan - 4:  ·  Problem: Irritable bowel syndrome.  Plan: lomotil. Pt  has  refused because  feels  well will d/c  Pruritus  banadryl  back  pain  HA  hydromorphone percocet  hypokalemia   refuses  IV  supplement   cannot  take  oral  supplements  secondary  to  Benadryl  and  lomotil  states  will  eat  more  fruits  such  as  bananas  discharge   home  laboratory  findings  reviewed  with  Pt  and  ID  follow  with  PMD  Neuro surgeon  to  call  me  if  any  further  questions
presents  with  fever  for  3 days  had  cervical  spine laminectomy  3  weeks  ago evaluated  in  urgent care center  advised to  go  to  [facility]  evaluated  in  [facility]  found  to  have  sepsis  admitted  for  further  w/u  and  treatment (28 Jul 2018 19:16)  has issues with getting pain meds   on dilaudid 4 q 3 hours at home prn ;;  feels better today   still with temps     Allergies    tetracycline (Hives)    Intolerances        MEDICATIONS  (STANDING):  cefepime   IVPB      cefepime   IVPB 2000 milliGRAM(s) IV Intermittent every 8 hours  docusate sodium 100 milliGRAM(s) Oral two times a day  doxycycline IVPB      doxycycline IVPB 100 milliGRAM(s) IV Intermittent every 12 hours  enoxaparin Injectable 40 milliGRAM(s) SubCutaneous daily  HYDROmorphone   Tablet 2 milliGRAM(s) Oral every 6 hours  nystatin Cream 1 Application(s) Topical two times a day  senna 2 Tablet(s) Oral at bedtime  sodium chloride 0.9%. 1000 milliLiter(s) (75 mL/Hr) IV Continuous <Continuous>  vancomycin  IVPB 1000 milliGRAM(s) IV Intermittent every 12 hours    MEDICATIONS  (PRN):  acetaminophen   Tablet 650 milliGRAM(s) Oral every 6 hours PRN For Temp greater than 38 C (100.4 F)  acetaminophen   Tablet. 650 milliGRAM(s) Oral every 6 hours PRN Mild Pain (1 - 3)  diazepam    Tablet 5 milliGRAM(s) Oral two times a day PRN anxiety  diphenhydrAMINE   Capsule 25 milliGRAM(s) Oral every 6 hours PRN Rash and/or Itching  ondansetron Injectable 4 milliGRAM(s) IV Push every 8 hours PRN Nausea and/or Vomiting  oxyCODONE    5 mG/acetaminophen 325 mG 1 Tablet(s) Oral every 6 hours PRN Moderate Pain (4 - 6)  polyethylene glycol 3350 17 Gram(s) Oral daily PRN Constipation      REVIEW OF SYSTEMS:    CONSTITUTIONAL: plus  fever, chills,diaphoreses  had a headache ; disappeared after dilaudid   no  weight loss, or fatigue  HEENT: No sore throat, runny nose, ear ache  RESPIRATORY: No cough, wheezing, No shortness of breath  CARDIOVASCULAR: No chest pain, palpitations, dizziness  GASTROINTESTINAL: No abdominal pain. No nausea, vomiting, diarrhea  GENITOURINARY: No dysuria, increase frequency, hematuria, or incontinence  NEUROLOGICAL: No headaches, memory loss, loss of strength, numbness, or tremors, no weakness  EXTREMITY: No pedal edema BLE  SKIN: No itching, burning, rashes, or lesions     VITAL SIGNS:  T(C): 37.6 (07-31-18 @ 17:22), Max: 38.9 (07-31-18 @ 05:51)  T(F): 99.6 (07-31-18 @ 17:22), Max: 102 (07-31-18 @ 05:51)  HR: 99 (07-31-18 @ 17:22) (91 - 99)  BP: 129/75 (07-31-18 @ 17:22) (109/76 - 129/75)  RR: 18 (07-31-18 @ 17:22) (16 - 18)  SpO2: 96% (07-31-18 @ 17:22) (96% - 100%)  Wt(kg): --    PHYSICAL EXAM:    GENERAL: not in any distress  HEENT: Neck is supple, normocephalic, atraumatic   CHEST/LUNG: Clear to auscultation bilaterally; No rales, rhonchi, wheezing  HEART: Regular rate and rhythm; No murmurs, rubs, or gallops  ABDOMEN: Soft, Nontender, Nondistended; Bowel sounds present, no rebound   EXTREMITIES:  2+ Peripheral Pulses, No clubbing, cyanosis, or edema  no pow infection  SKIN: No rashes or lesions  BACK: no pressor sore   NERVOUS SYSTEM:  Alert & Oriented X3, Good concentration  PSYCH: normal affect     LABS:                         12.1   3.60  )-----------( 139      ( 31 Jul 2018 07:33 )             33.3     07-31    140  |  106  |  7   ----------------------------<  154<H>  3.5   |  26  |  1.04    Ca    7.9<L>      31 Jul 2018 07:33    TPro  5.4<L>  /  Alb  2.6<L>  /  TBili  0.8  /  DBili  x   /  AST  59<H>  /  ALT  39  /  AlkPhos  90  07-31    LIVER FUNCTIONS - ( 31 Jul 2018 07:33 )  Alb: 2.6 g/dL / Pro: 5.4 gm/dL / ALK PHOS: 90 U/L / ALT: 39 U/L / AST: 59 U/L / GGT: x                         Sedimentation Rate, Erythrocyte: 7 mm/hr (07-28 @ 21:27)  Sedimentation Rate, Erythrocyte: 10 mm/hr (07-28 @ 15:53)      Vancomycin Level, Trough: 6.8 ug/mL (07-30 @ 21:25)        Culture Results:   No growth to date. (07-29 @ 22:54)  Culture Results:   No growth to date. (07-29 @ 22:54)  Culture Results:   No growth to date. (07-28 @ 21:59)  Culture Results:   No growth to date. (07-28 @ 21:59)  Culture Results:   No growth (07-28 @ 21:46)                Radiology:

## 2018-08-03 NOTE — DISCHARGE NOTE ADULT - CARE PLAN
Principal Discharge DX:	Cellulitis of neck  Goal:	avoidance  of  recurrence  Assessment and plan of treatment:	finish  one  week  course  of  doxycycline  follow  with  PMD  Neurosurgeon  Secondary Diagnosis:	H/O laminectomy  Secondary Diagnosis:	Irritable bowel syndrome  Secondary Diagnosis:	Anxiety

## 2018-08-03 NOTE — DISCHARGE NOTE ADULT - CARE PROVIDER_API CALL
HERBERT,   Phone: (   )    -  Fax: (   )    -    PT's Alex Blancas,   Phone: (   )    -  Fax: (   )    -

## 2018-08-03 NOTE — DISCHARGE NOTE ADULT - PROVIDER TOKENS
FREE:[LAST:[PMD],PHONE:[(   )    -],FAX:[(   )    -]],FREE:[LAST:[PT's Alex Blancas],PHONE:[(   )    -],FAX:[(   )    -]]

## 2018-08-03 NOTE — DISCHARGE NOTE ADULT - HOSPITAL COURSE
patient  treated  with  IVF  IVAB   for  cellulitis  of  neck  in  setting  of  recent  cervical  spine  surgery. was  evaluated  by  ID  Neurosurgery  Neurology.  patient  with  excellent  clinical  improvement  fever  trended  downward appears  to  have  resolved  radiological  and  Laboratory  w/u  inconclusive   cellulitis  resolved.  findings  discussed  with  patient  to  have  F/U  with  pmd  within  one  week  to  repeat  labs  including  CBC  for  anemia  and  a  CMP  for  LFT elevation (  thought  to  be  secondary  to  Doxycycline) for  Hypokalemia  (  patient  refusing  IV  K  supplementation cannot  get  oral  secondary  to  Lomotil) states  will  eat  K  rich  foods. patient  to  call  me  if  any further  questions

## 2018-08-03 NOTE — DIETITIAN INITIAL EVALUATION ADULT. - ENERGY NEEDS
Height (cm): 165.1 (07-28)  Weight (kg): 82.9 (08-03)  BMI (kg/m2): 30.4 (08-03)  IBW:  61.8 kg +/- 10%     % IBW:  134%     UBW:   stable      %UBW: 100%

## 2018-08-03 NOTE — DIETITIAN INITIAL EVALUATION ADULT. - OTHER INFO
Pt seen for LOS. Pt lives c spouse; independent c ADL. Denies any N/V/C/D or chew/swallowing issues. Pt reports appetite good; wt stable. Medical chart indicates pt c DM; pt denies; no DM meds; A1c @ 5.1 WDL

## 2018-08-03 NOTE — DIETITIAN INITIAL EVALUATION ADULT. - PERTINENT LABORATORY DATA
08-03 Na146 mmol/L<H> Glu 100 mg/dL<H> K+ 3.3 mmol/L<L> Cr  0.82 mg/dL BUN 6 mg/dL<L> 08-03 Alb 2.4 g/dL<L> 07-30 [id number] 5.1 %

## 2018-08-03 NOTE — DISCHARGE NOTE ADULT - PATIENT PORTAL LINK FT
You can access the [facility] Patient Portal, offered by [facility], by registering with the following website: [email or web address]

## 2018-08-03 NOTE — DISCHARGE NOTE ADULT - MEDICATION SUMMARY - MEDICATIONS TO STOP TAKING
I will STOP taking the medications listed below when I get home from the hospital:    Xanax I will STOP taking the medications listed below when I get home from the hospital:  None

## 2018-08-03 NOTE — DIETITIAN INITIAL EVALUATION ADULT. - PERTINENT MEDS FT
Lovenox, Nystatin, Maxipime, Vancomycin, Doxycycline, Lomotil, Dilaudid, percocet, Benadryl, Zofran, Miralax, Valium

## 2018-08-07 LAB
CULTURE RESULTS: SIGNIFICANT CHANGE UP
SPECIMEN SOURCE: SIGNIFICANT CHANGE UP

## 2018-08-08 DIAGNOSIS — A41.9 SEPSIS, UNSPECIFIED ORGANISM: ICD-10-CM

## 2018-08-08 DIAGNOSIS — F41.9 ANXIETY DISORDER, UNSPECIFIED: ICD-10-CM

## 2018-08-08 DIAGNOSIS — K58.9 IRRITABLE BOWEL SYNDROME WITHOUT DIARRHEA: ICD-10-CM

## 2018-08-08 DIAGNOSIS — Z88.1 ALLERGY STATUS TO OTHER ANTIBIOTIC AGENTS STATUS: ICD-10-CM

## 2018-08-08 DIAGNOSIS — E87.6 HYPOKALEMIA: ICD-10-CM

## 2018-08-08 DIAGNOSIS — L03.221 CELLULITIS OF NECK: ICD-10-CM

## 2021-10-04 PROCEDURE — 88305 TISSUE EXAM BY PATHOLOGIST: CPT | Performed by: COLON & RECTAL SURGERY

## 2021-10-05 ENCOUNTER — LAB REQUISITION (OUTPATIENT)
Dept: LAB | Facility: HOSPITAL | Age: 51
End: 2021-10-05
Attending: COLON & RECTAL SURGERY

## 2021-10-05 DIAGNOSIS — Z12.11 ENCOUNTER FOR SCREENING FOR MALIGNANT NEOPLASM OF COLON: ICD-10-CM

## 2021-10-06 LAB
CASE RPRT: NORMAL
CLINICAL INFO: NORMAL
PATH REPORT.FINAL DX SPEC: NORMAL
PATH REPORT.GROSS SPEC: NORMAL

## 2022-01-25 NOTE — PHYSICAL THERAPY INITIAL EVALUATION ADULT - GAIT PATTERN USED, PT EVAL
swing-through gait Paramedian Forehead Flap Text: A decision was made to reconstruct the defect utilizing an interpolation axial flap and a staged reconstruction.  A telfa template was made of the defect.  This telfa template was then used to outline the paramedian forehead pedicle flap.  The donor area for the pedicle flap was then injected with anesthesia.  The flap was excised through the skin and subcutaneous tissue down to the layer of the underlying musculature.  The pedicle flap was carefully excised within this deep plane to maintain its blood supply.  The edges of the donor site were undermined.   The donor site was closed in a primary fashion.  The pedicle was then rotated into position and sutured.  Once the tube was sutured into place, adequate blood supply was confirmed with blanching and refill.  The pedicle was then wrapped with xeroform gauze and dressed appropriately with a telfa and gauze bandage to ensure continued blood supply and protect the attached pedicle.

## 2022-06-05 ENCOUNTER — APPOINTMENT (EMERGENCY)
Dept: RADIOLOGY | Facility: HOSPITAL | Age: 52
End: 2022-06-05
Payer: COMMERCIAL

## 2022-06-05 ENCOUNTER — HOSPITAL ENCOUNTER (EMERGENCY)
Facility: HOSPITAL | Age: 52
Discharge: HOME | End: 2022-06-05
Attending: EMERGENCY MEDICINE
Payer: COMMERCIAL

## 2022-06-05 VITALS
RESPIRATION RATE: 20 BRPM | DIASTOLIC BLOOD PRESSURE: 96 MMHG | HEART RATE: 89 BPM | OXYGEN SATURATION: 98 % | TEMPERATURE: 98.2 F | SYSTOLIC BLOOD PRESSURE: 150 MMHG

## 2022-06-05 DIAGNOSIS — M54.9 BACK PAIN, UNSPECIFIED BACK LOCATION, UNSPECIFIED BACK PAIN LATERALITY, UNSPECIFIED CHRONICITY: ICD-10-CM
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
## 2022-06-05 DIAGNOSIS — S16.1XXA STRAIN OF NECK MUSCLE, INITIAL ENCOUNTER: ICD-10-CM

## 2022-06-05 DIAGNOSIS — S42.401A CLOSED FRACTURE OF RIGHT ELBOW, INITIAL ENCOUNTER: ICD-10-CM

## 2022-06-05 PROCEDURE — 99283 EMERGENCY DEPT VISIT LOW MDM: CPT | Mod: 25

## 2022-06-05 PROCEDURE — 29105 APPLICATION LONG ARM SPLINT: CPT | Mod: RT

## 2022-06-05 PROCEDURE — 73080 X-RAY EXAM OF ELBOW: CPT | Mod: RT

## 2022-06-05 PROCEDURE — 2W38X1Z IMMOBILIZATION OF RIGHT UPPER EXTREMITY USING SPLINT: ICD-10-PCS | Performed by: EMERGENCY MEDICINE

## 2022-06-05 ASSESSMENT — ENCOUNTER SYMPTOMS
SORE THROAT: 0
NECK PAIN: 1
FACIAL SWELLING: 0
ABDOMINAL PAIN: 0
SHORTNESS OF BREATH: 0
BACK PAIN: 1
LOSS OF CONSCIOUSNESS: 0
VOMITING: 0
DIARRHEA: 0
WHEEZING: 0
ALTERED MENTAL STATUS: 0
DIFFICULTY URINATING: 0
SPEECH DIFFICULTY: 0
SEIZURES: 0
WEAKNESS: 0
DIAPHORESIS: 0
AGITATION: 0
HEADACHES: 0
FEVER: 0
HEMATURIA: 0
COUGH: 0
COLOR CHANGE: 0
NAUSEA: 0
ACTIVITY CHANGE: 0
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
## 2022-06-05 NOTE — DISCHARGE INSTRUCTIONS
Expect to be more sore tomorrow    Ice to sore areas 20 minutes at a time    Ibuprofen 600mg every 8 hours for the next 3 days, then as needed for pain.    Please call Dr. Noonan for re-evaluation by mid week.
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
## 2022-08-25 NOTE — DISCHARGE NOTE ADULT - FUNCTIONAL SCREEN CURRENT LEVEL: BATHING, MLM
----- Message from Ebenezer Bruno sent at 8/25/2022  3:49 PM CDT -----  Contact: [phone]  [facility] pharmacy called to get clearfication on the predniSONE (DELTASONE) 10 MG tablet it says 2 times per day but its 30 pills for 15  days     [id number] - [place] - [address]  [place] [address]  Phone: [phone] Fax: [phone]         (0) independent

## 2024-03-22 NOTE — ED ADULT NURSE NOTE - EXTENSIONS OF SELF_ADULT
Patient is requesting refill of vitamin D3 - patient states pharmacy only gave him 4 capsules     Cued    She Kapadia RN  [facility]     Eyeglasses

## 2025-05-22 NOTE — ED ADULT NURSE NOTE - PRO INTERPRETER NEED 2
English lower back pain chronic, h/o restless leg syndrome, h/o MSSA in spinal hardware - on defadroxil for long term management. Repeat blood culture negative (last ID visit 5/14/25) in [facility]